# Patient Record
Sex: FEMALE | Race: WHITE | NOT HISPANIC OR LATINO | Employment: FULL TIME | URBAN - METROPOLITAN AREA
[De-identification: names, ages, dates, MRNs, and addresses within clinical notes are randomized per-mention and may not be internally consistent; named-entity substitution may affect disease eponyms.]

---

## 2017-05-16 ENCOUNTER — PATIENT MESSAGE (OUTPATIENT)
Dept: INTERNAL MEDICINE | Facility: CLINIC | Age: 47
End: 2017-05-16

## 2017-05-16 RX ORDER — ESCITALOPRAM OXALATE 10 MG/1
TABLET ORAL
Qty: 30 TABLET | Refills: 5 | Status: SHIPPED | OUTPATIENT
Start: 2017-05-16 | End: 2018-01-26

## 2017-06-07 ENCOUNTER — OFFICE VISIT (OUTPATIENT)
Dept: INTERNAL MEDICINE | Facility: CLINIC | Age: 47
End: 2017-06-07
Payer: COMMERCIAL

## 2017-06-07 ENCOUNTER — HOSPITAL ENCOUNTER (OUTPATIENT)
Dept: RADIOLOGY | Facility: HOSPITAL | Age: 47
Discharge: HOME | End: 2017-06-07
Attending: INTERNAL MEDICINE
Payer: COMMERCIAL

## 2017-06-07 VITALS
SYSTOLIC BLOOD PRESSURE: 134 MMHG | WEIGHT: 130.31 LBS | HEIGHT: 59 IN | DIASTOLIC BLOOD PRESSURE: 85 MMHG | HEART RATE: 70 BPM | BODY MASS INDEX: 26.27 KG/M2

## 2017-06-07 DIAGNOSIS — M25.541 ARTHRALGIA OF HANDS, BILATERAL: ICD-10-CM

## 2017-06-07 DIAGNOSIS — M53.3 SACRO-ILIAC PAIN: Primary | ICD-10-CM

## 2017-06-07 DIAGNOSIS — M25.542 ARTHRALGIA OF HANDS, BILATERAL: ICD-10-CM

## 2017-06-07 DIAGNOSIS — M25.475 SWELLING OF FIRST METATARSOPHALANGEAL (MTP) JOINT OF LEFT FOOT: ICD-10-CM

## 2017-06-07 DIAGNOSIS — M21.619 BUNION OF GREAT TOE: ICD-10-CM

## 2017-06-07 DIAGNOSIS — M53.3 SACRO-ILIAC PAIN: ICD-10-CM

## 2017-06-07 PROCEDURE — 73130 X-RAY EXAM OF HAND: CPT | Mod: 50,TC,PO

## 2017-06-07 PROCEDURE — 73130 X-RAY EXAM OF HAND: CPT | Mod: 26,50,, | Performed by: RADIOLOGY

## 2017-06-07 PROCEDURE — 72202 X-RAY EXAM SI JOINTS 3/> VWS: CPT | Mod: 26,,, | Performed by: RADIOLOGY

## 2017-06-07 PROCEDURE — 72100 X-RAY EXAM L-S SPINE 2/3 VWS: CPT | Mod: 26,,, | Performed by: RADIOLOGY

## 2017-06-07 PROCEDURE — 73630 X-RAY EXAM OF FOOT: CPT | Mod: TC,PO,LT

## 2017-06-07 PROCEDURE — 72100 X-RAY EXAM L-S SPINE 2/3 VWS: CPT | Mod: TC,PO

## 2017-06-07 PROCEDURE — 99999 PR PBB SHADOW E&M-EST. PATIENT-LVL IV: CPT | Mod: PBBFAC,,, | Performed by: INTERNAL MEDICINE

## 2017-06-07 PROCEDURE — 99214 OFFICE O/P EST MOD 30 MIN: CPT | Mod: S$GLB,,, | Performed by: INTERNAL MEDICINE

## 2017-06-07 PROCEDURE — 72202 X-RAY EXAM SI JOINTS 3/> VWS: CPT | Mod: TC,PO

## 2017-06-07 PROCEDURE — 73630 X-RAY EXAM OF FOOT: CPT | Mod: 26,LT,, | Performed by: RADIOLOGY

## 2017-06-07 RX ORDER — DICLOFENAC SODIUM 75 MG/1
75 TABLET, DELAYED RELEASE ORAL 2 TIMES DAILY
Qty: 20 TABLET | Refills: 0 | Status: SHIPPED | OUTPATIENT
Start: 2017-06-07 | End: 2017-06-17

## 2017-06-07 RX ORDER — CYCLOBENZAPRINE HCL 5 MG
5 TABLET ORAL NIGHTLY
Qty: 10 TABLET | Refills: 0 | Status: SHIPPED | OUTPATIENT
Start: 2017-06-07 | End: 2017-06-17

## 2017-06-07 NOTE — PROGRESS NOTES
REASON FOR VISIT:  This is a 46-year-old female.  For the past week, she has   been experiencing a pain localized in her right lower and upper buttocks region.    She states that it is similar to a pain that she had back in 2012, where she   had an on-the-job injury carrying boxes and she was diagnosed with right SI   joint dysfunction.  She underwent physical therapy and dry needling at that   time.  She mentions that her son has autism, mobility issue and scoliosis.  He   is 10 years old, weighs around 60+ pounds and she is constantly moving him and   picking him up and feels that it has put strain.    Another issue that has been going on for a while is pain at the MTP joint left   foot at the first and fifth metatarsal, but worse at the big toe and she has   also developed arthralgias involving her fingers, particularly at the MCP and   DIP.  At times they feel very stiff.    PAST MEDICAL HISTORY:  Depression, anxiety.  History of rhinitis.  Asthma.    MEDICATIONS:  List per MedCard.    PHYSICAL EXAMINATION:  VITAL SIGNS:  Per EPIC.  EXTREMITIES:  Right now she is not tender over the MCP joints of both hands.    There is some minimal nodular findings noted at the DIP joints.  She has 2+   biceps, triceps, knee and ankle jerk reflexes.  She is tender over the first MTP   left foot and bunion deformity noted.  There is an area of discomfort in the   right lower back localized and at the upper buttocks.    IMPRESSION:  1.  Acute lumbosacral pain.  2.  Bunion deformity.  3.  MTP joint pain, possibly arthritis.  4.  Hand arthralgias.    PLAN:    Today we will get x-rays of the lumbar spine, sacroiliac joints, and of both   hands and left foot.  We will get labs of sed rate, CRP, MELINDA, rheumatoid   arthritis factor, CCP.    In the meantime, diclofenac 75 mg twice a day for 10 days, Flexeril 5 mg at   bedtime.          /lissette [id number] review        KARYN/DIANE  dd: 06/07/2017 16:49:37 (CDT)  td: 06/08/2017 10:29:39 (CDT)  Doc ID    #[id number]  Job ID #[id number]    CC:

## 2017-06-10 ENCOUNTER — PATIENT MESSAGE (OUTPATIENT)
Dept: INTERNAL MEDICINE | Facility: CLINIC | Age: 47
End: 2017-06-10

## 2017-06-15 ENCOUNTER — PATIENT MESSAGE (OUTPATIENT)
Dept: INTERNAL MEDICINE | Facility: CLINIC | Age: 47
End: 2017-06-15

## 2017-06-23 ENCOUNTER — PATIENT MESSAGE (OUTPATIENT)
Dept: INTERNAL MEDICINE | Facility: CLINIC | Age: 47
End: 2017-06-23

## 2017-06-26 ENCOUNTER — LAB VISIT (OUTPATIENT)
Dept: LAB | Facility: HOSPITAL | Age: 47
End: 2017-06-26
Attending: INTERNAL MEDICINE
Payer: COMMERCIAL

## 2017-06-26 ENCOUNTER — OFFICE VISIT (OUTPATIENT)
Dept: INTERNAL MEDICINE | Facility: CLINIC | Age: 47
End: 2017-06-26
Payer: COMMERCIAL

## 2017-06-26 VITALS
HEIGHT: 59 IN | DIASTOLIC BLOOD PRESSURE: 86 MMHG | WEIGHT: 131.38 LBS | SYSTOLIC BLOOD PRESSURE: 128 MMHG | BODY MASS INDEX: 26.48 KG/M2 | HEART RATE: 74 BPM

## 2017-06-26 DIAGNOSIS — M25.50 ARTHRALGIA, UNSPECIFIED JOINT: ICD-10-CM

## 2017-06-26 DIAGNOSIS — R23.3 PETECHIAE: ICD-10-CM

## 2017-06-26 DIAGNOSIS — R51.9 ACUTE NONINTRACTABLE HEADACHE, UNSPECIFIED HEADACHE TYPE: ICD-10-CM

## 2017-06-26 DIAGNOSIS — R51.9 ACUTE NONINTRACTABLE HEADACHE, UNSPECIFIED HEADACHE TYPE: Primary | ICD-10-CM

## 2017-06-26 DIAGNOSIS — H81.93 VESTIBULAR DIZZINESS, BILATERAL: ICD-10-CM

## 2017-06-26 LAB
ANION GAP SERPL CALC-SCNC: 9 MMOL/L
BASOPHILS # BLD AUTO: 0.03 K/UL
BASOPHILS NFR BLD: 0.4 %
BUN SERPL-MCNC: 10 MG/DL
CALCIUM SERPL-MCNC: 9.9 MG/DL
CHLORIDE SERPL-SCNC: 101 MMOL/L
CO2 SERPL-SCNC: 27 MMOL/L
CREAT SERPL-MCNC: 0.9 MG/DL
CRP SERPL-MCNC: 3.6 MG/L
DIFFERENTIAL METHOD: NORMAL
EOSINOPHIL # BLD AUTO: 0.2 K/UL
EOSINOPHIL NFR BLD: 2.8 %
ERYTHROCYTE [DISTWIDTH] IN BLOOD BY AUTOMATED COUNT: 13.1 %
ERYTHROCYTE [SEDIMENTATION RATE] IN BLOOD BY WESTERGREN METHOD: 6 MM/HR
EST. GFR  (AFRICAN AMERICAN): >60 ML/MIN/1.73 M^2
EST. GFR  (NON AFRICAN AMERICAN): >60 ML/MIN/1.73 M^2
GLUCOSE SERPL-MCNC: 92 MG/DL
HCT VFR BLD AUTO: 45.8 %
HGB BLD-MCNC: 15 G/DL
LYMPHOCYTES # BLD AUTO: 2.7 K/UL
LYMPHOCYTES NFR BLD: 35 %
MCH RBC QN AUTO: 28.8 PG
MCHC RBC AUTO-ENTMCNC: 32.8 %
MCV RBC AUTO: 88 FL
MONOCYTES # BLD AUTO: 0.8 K/UL
MONOCYTES NFR BLD: 9.7 %
NEUTROPHILS # BLD AUTO: 4 K/UL
NEUTROPHILS NFR BLD: 52.1 %
PLATELET # BLD AUTO: 323 K/UL
PMV BLD AUTO: 9.6 FL
POTASSIUM SERPL-SCNC: 4.4 MMOL/L
RBC # BLD AUTO: 5.2 M/UL
SODIUM SERPL-SCNC: 137 MMOL/L
WBC # BLD AUTO: 7.75 K/UL

## 2017-06-26 PROCEDURE — 86039 ANTINUCLEAR ANTIBODIES (ANA): CPT

## 2017-06-26 PROCEDURE — 85025 COMPLETE CBC W/AUTO DIFF WBC: CPT | Mod: PO

## 2017-06-26 PROCEDURE — 99999 PR PBB SHADOW E&M-EST. PATIENT-LVL III: CPT | Mod: PBBFAC,,, | Performed by: INTERNAL MEDICINE

## 2017-06-26 PROCEDURE — 36415 COLL VENOUS BLD VENIPUNCTURE: CPT | Mod: PO

## 2017-06-26 PROCEDURE — 80048 BASIC METABOLIC PNL TOTAL CA: CPT

## 2017-06-26 PROCEDURE — 86140 C-REACTIVE PROTEIN: CPT

## 2017-06-26 PROCEDURE — 86038 ANTINUCLEAR ANTIBODIES: CPT

## 2017-06-26 PROCEDURE — 86235 NUCLEAR ANTIGEN ANTIBODY: CPT | Mod: 59

## 2017-06-26 PROCEDURE — 99214 OFFICE O/P EST MOD 30 MIN: CPT | Mod: S$GLB,,, | Performed by: INTERNAL MEDICINE

## 2017-06-26 PROCEDURE — 85651 RBC SED RATE NONAUTOMATED: CPT

## 2017-06-26 NOTE — TELEPHONE ENCOUNTER
Called pt back and pt states that in the past few weeks she has been experiencing severe dizziness and after the dizziness subsided the she started with a terrible headache. Pt states that she is just healing from mouth and nose sores. She said that the headache was like a pounding pressure. She feels she needed to be seen today. Scheduled for 3755am

## 2017-06-26 NOTE — PROGRESS NOTES
REASON FOR VISIT:  This is a 46-year-old female.  The reason for her visit is   that three days ago after getting home in the late afternoon, she was walking in   her house and all of a sudden felt dizziness that came upon her, as if she   needed to hold onto the walls and she just felt weak.  It lasted for about three   hours.  It was hard for her to sit up in bed.  There was no nausea.  She could   not focus well, but no other acute visual problems.  No hearing problems.  She   did not have any paresthesias involving the extremities.  Since that time she   has persistently had a pressure discomfort which she would not describe as a   headache, but just a pressure discomfort on top of her head.  Prior to that, she   did develop sores within her mouth which have passed and also red, erythematous   dots on her legs which she showed me a picture of and which looked like small   petechiae.  In reference to her visit with me on June 8, she still has   arthralgias that involve her hands, elbows, and feet.  She did have a mildly   positive MELINDA, but the subsets were negative and inflammatory markers were   normal.    PAST MEDICAL HISTORY:  Anxiety and depression.    MEDICATIONS:  Lexapro.    PHYSICAL EXAMINATION:  VITAL SIGNS:  Blood pressure 128/86.  LUNGS:  Clear.  HEART:  Regular rate and rhythm.  NECK:  No thyromegaly.  NEUROLOGIC:  Cranial nerves II-XII are normal.  Negative Romberg testing.    Negative finger-to-nose.  MUSCULOSKELETAL:  2+ biceps, triceps, knee and ankle jerk reflexes.    IMPRESSION:  1.  Acute headache with dizziness.  2.  Possible vasculitis.  3.  Arthralgias.    PLAN:  Today while she is here, we will repeat a CBC with basic metabolic   profile and get another MELINDA titer.  Her rheumatoid arthritis factor and CCP were   negative.  She may need to be seen in Rheumatology.  The other tests to do will   be a CT of the head with CTA of the head.    /sc [id number] review      JAM/DIANE  dd: 06/26/2017 12:09:28  (CDT)  td: 06/27/2017 03:24:21 (CDT)  Doc ID   #[id number]  Job ID #[id number]    CC:             Until dictation becomes available , this is to note that patient presents with reoccurring episodes right sided headaches , dizziness , both being off balance as well spinning vertigo like dizziness , visual distortion       She is also getting a reoccurring dermatitis and arthralgia    Recommend pursuing cta of head and neck

## 2017-06-27 LAB
ANA SER QL IF: POSITIVE
ANA TITR SER IF: NORMAL {TITER}

## 2017-06-29 ENCOUNTER — TELEPHONE (OUTPATIENT)
Dept: INTERNAL MEDICINE | Facility: CLINIC | Age: 47
End: 2017-06-29

## 2017-06-29 LAB
ANTI SM ANTIBODY: 1.2 EU
ANTI SM/RNP ANTIBODY: 0.69 EU
ANTI-SM INTERPRETATION: NEGATIVE
ANTI-SM/RNP INTERPRETATION: NEGATIVE
ANTI-SSA ANTIBODY: 1.2 EU
ANTI-SSA INTERPRETATION: NEGATIVE
ANTI-SSB ANTIBODY: 0.29 EU
ANTI-SSB INTERPRETATION: NEGATIVE
DSDNA AB SER-ACNC: NORMAL [IU]/ML

## 2017-06-29 NOTE — TELEPHONE ENCOUNTER
Please put in notes so that pt can get her ct done, she does not want to reschedule because of her headaches . They will not go through with the procedure unless there are labs in because the visit needs to be authorized.

## 2017-07-01 ENCOUNTER — HOSPITAL ENCOUNTER (OUTPATIENT)
Dept: RADIOLOGY | Facility: HOSPITAL | Age: 47
Discharge: HOME | End: 2017-07-01
Attending: INTERNAL MEDICINE
Payer: COMMERCIAL

## 2017-07-01 DIAGNOSIS — R51.9 ACUTE NONINTRACTABLE HEADACHE, UNSPECIFIED HEADACHE TYPE: ICD-10-CM

## 2017-07-01 DIAGNOSIS — R23.3 PETECHIAE: ICD-10-CM

## 2017-07-01 DIAGNOSIS — H81.93 VESTIBULAR DIZZINESS, BILATERAL: ICD-10-CM

## 2017-07-01 DIAGNOSIS — M25.50 ARTHRALGIA, UNSPECIFIED JOINT: ICD-10-CM

## 2017-07-01 PROCEDURE — 70496 CT ANGIOGRAPHY HEAD: CPT | Mod: TC

## 2017-07-01 PROCEDURE — 25500020 PHARM REV CODE 255: Performed by: INTERNAL MEDICINE

## 2017-07-01 PROCEDURE — 70498 CT ANGIOGRAPHY NECK: CPT | Mod: 26,,, | Performed by: RADIOLOGY

## 2017-07-01 PROCEDURE — 70496 CT ANGIOGRAPHY HEAD: CPT | Mod: 26,,, | Performed by: RADIOLOGY

## 2017-07-01 RX ADMIN — IOHEXOL 75 ML: 350 INJECTION, SOLUTION INTRAVENOUS at 11:07

## 2017-07-03 ENCOUNTER — PATIENT MESSAGE (OUTPATIENT)
Dept: INTERNAL MEDICINE | Facility: CLINIC | Age: 47
End: 2017-07-03

## 2017-07-03 RX ORDER — ETODOLAC 400 MG/1
400 TABLET, FILM COATED ORAL 2 TIMES DAILY
Qty: 20 TABLET | Refills: 0 | Status: SHIPPED | OUTPATIENT
Start: 2017-07-03 | End: 2017-07-13

## 2017-07-03 NOTE — TELEPHONE ENCOUNTER
CTA head was fine   Lab results were fine     Main complaint is back pain and mouth ulcers    Call in Lodine and dukes solution

## 2017-10-11 ENCOUNTER — OFFICE VISIT (OUTPATIENT)
Dept: INTERNAL MEDICINE | Facility: CLINIC | Age: 47
End: 2017-10-11
Payer: COMMERCIAL

## 2017-10-11 VITALS
DIASTOLIC BLOOD PRESSURE: 79 MMHG | HEART RATE: 79 BPM | HEIGHT: 59 IN | WEIGHT: 121.69 LBS | BODY MASS INDEX: 24.53 KG/M2 | SYSTOLIC BLOOD PRESSURE: 123 MMHG | TEMPERATURE: 99 F

## 2017-10-11 DIAGNOSIS — H65.03 BILATERAL ACUTE SEROUS OTITIS MEDIA, RECURRENCE NOT SPECIFIED: ICD-10-CM

## 2017-10-11 DIAGNOSIS — J01.90 ACUTE NON-RECURRENT SINUSITIS, UNSPECIFIED LOCATION: Primary | ICD-10-CM

## 2017-10-11 PROCEDURE — 99999 PR PBB SHADOW E&M-EST. PATIENT-LVL III: CPT | Mod: PBBFAC,,, | Performed by: INTERNAL MEDICINE

## 2017-10-11 PROCEDURE — 96372 THER/PROPH/DIAG INJ SC/IM: CPT | Mod: S$GLB,,, | Performed by: INTERNAL MEDICINE

## 2017-10-11 PROCEDURE — 99213 OFFICE O/P EST LOW 20 MIN: CPT | Mod: 25,S$GLB,, | Performed by: INTERNAL MEDICINE

## 2017-10-11 RX ORDER — ISOTRETINOIN 40 MG/1
40 CAPSULE ORAL 2 TIMES DAILY
Refills: 0 | COMMUNITY
Start: 2017-10-05 | End: 2018-07-22

## 2017-10-11 RX ORDER — AZITHROMYCIN 250 MG/1
TABLET, FILM COATED ORAL
Qty: 6 TABLET | Refills: 1 | Status: SHIPPED | OUTPATIENT
Start: 2017-10-11 | End: 2018-01-26

## 2017-10-11 RX ORDER — BETAMETHASONE SODIUM PHOSPHATE AND BETAMETHASONE ACETATE 3; 3 MG/ML; MG/ML
12 INJECTION, SUSPENSION INTRA-ARTICULAR; INTRALESIONAL; INTRAMUSCULAR; SOFT TISSUE ONCE
Status: COMPLETED | OUTPATIENT
Start: 2017-10-11 | End: 2017-10-11

## 2017-10-11 RX ORDER — MUPIROCIN 20 MG/G
OINTMENT TOPICAL
COMMUNITY
Start: 2017-08-31 | End: 2018-01-26

## 2017-10-11 RX ADMIN — BETAMETHASONE SODIUM PHOSPHATE AND BETAMETHASONE ACETATE 12 MG: 3; 3 INJECTION, SUSPENSION INTRA-ARTICULAR; INTRALESIONAL; INTRAMUSCULAR; SOFT TISSUE at 03:10

## 2017-10-11 NOTE — PROGRESS NOTES
REASON FOR VISIT:  This is a 46-year-old female.  For a week now, she has been   sneezing, now very congested and uncomfortable, and pressure across her face.    Ears feel full and popping.  She is blowing out thick green mucus.  Her throat   is irritated.    PAST MEDICAL HISTORY:   Anxiety, depression.  Acne.    MEDICATIONS:  List per MedCard.    PHYSICAL EXAMINATION:  VITAL SIGNS:  Per Epic.  HEENT:  Ear canals are open, but very opaque.  Nasal mucosa is congested in the   left nostril.  Tender over the left maxillary sinus.  Oropharynx hyperemic.  NECK:  No adenopathy, although she saw within the left submandibular region.  LUNGS:  Clear.    IMPRESSION:  1.  Acute sinusitis.  2.  Serous otitis media.    PLAN:  Zithromax Z-Richard with one refill given, Celestone 12 mg IM, and the use of   Mucinex.    /sc [id number] review      JAM/DIANE  dd: 10/11/2017 15:49:07 (CDT)  td: 10/12/2017 05:50:23 (CDT)  Doc ID   #[id number]  Job ID #[id number]    CC:

## 2018-01-24 ENCOUNTER — PATIENT MESSAGE (OUTPATIENT)
Dept: INTERNAL MEDICINE | Facility: CLINIC | Age: 48
End: 2018-01-24

## 2018-01-26 ENCOUNTER — OFFICE VISIT (OUTPATIENT)
Dept: INTERNAL MEDICINE | Facility: CLINIC | Age: 48
End: 2018-01-26
Payer: COMMERCIAL

## 2018-01-26 ENCOUNTER — LAB VISIT (OUTPATIENT)
Dept: LAB | Facility: HOSPITAL | Age: 48
End: 2018-01-26
Attending: INTERNAL MEDICINE
Payer: COMMERCIAL

## 2018-01-26 VITALS
BODY MASS INDEX: 25.64 KG/M2 | DIASTOLIC BLOOD PRESSURE: 82 MMHG | WEIGHT: 127.19 LBS | HEIGHT: 59 IN | SYSTOLIC BLOOD PRESSURE: 134 MMHG | TEMPERATURE: 98 F | HEART RATE: 79 BPM

## 2018-01-26 DIAGNOSIS — R10.84 GENERALIZED ABDOMINAL CRAMPS: ICD-10-CM

## 2018-01-26 DIAGNOSIS — R19.7 DIARRHEA, UNSPECIFIED TYPE: ICD-10-CM

## 2018-01-26 DIAGNOSIS — R42 VERTIGO: ICD-10-CM

## 2018-01-26 DIAGNOSIS — K58.2 IRRITABLE BOWEL SYNDROME WITH BOTH CONSTIPATION AND DIARRHEA: ICD-10-CM

## 2018-01-26 DIAGNOSIS — H92.03 OTALGIA OF BOTH EARS: ICD-10-CM

## 2018-01-26 DIAGNOSIS — R42 VERTIGO: Primary | ICD-10-CM

## 2018-01-26 LAB
ALBUMIN SERPL BCP-MCNC: 3.7 G/DL
ALP SERPL-CCNC: 58 U/L
ALT SERPL W/O P-5'-P-CCNC: 8 U/L
ANION GAP SERPL CALC-SCNC: 9 MMOL/L
AST SERPL-CCNC: 16 U/L
BASOPHILS # BLD AUTO: 0.03 K/UL
BASOPHILS NFR BLD: 0.4 %
BILIRUB SERPL-MCNC: 0.4 MG/DL
BUN SERPL-MCNC: 15 MG/DL
CALCIUM SERPL-MCNC: 9.5 MG/DL
CHLORIDE SERPL-SCNC: 104 MMOL/L
CO2 SERPL-SCNC: 26 MMOL/L
CREAT SERPL-MCNC: 0.8 MG/DL
CRP SERPL-MCNC: 1.3 MG/L
DIFFERENTIAL METHOD: ABNORMAL
EOSINOPHIL # BLD AUTO: 0.2 K/UL
EOSINOPHIL NFR BLD: 2.8 %
ERYTHROCYTE [DISTWIDTH] IN BLOOD BY AUTOMATED COUNT: 12.9 %
ERYTHROCYTE [SEDIMENTATION RATE] IN BLOOD BY WESTERGREN METHOD: 6 MM/HR
EST. GFR  (AFRICAN AMERICAN): >60 ML/MIN/1.73 M^2
EST. GFR  (NON AFRICAN AMERICAN): >60 ML/MIN/1.73 M^2
GLUCOSE SERPL-MCNC: 90 MG/DL
HCT VFR BLD AUTO: 43.6 %
HGB BLD-MCNC: 14.3 G/DL
LYMPHOCYTES # BLD AUTO: 2.7 K/UL
LYMPHOCYTES NFR BLD: 38.3 %
MCH RBC QN AUTO: 29.2 PG
MCHC RBC AUTO-ENTMCNC: 32.8 G/DL
MCV RBC AUTO: 89 FL
MONOCYTES # BLD AUTO: 0.8 K/UL
MONOCYTES NFR BLD: 11.5 %
NEUTROPHILS # BLD AUTO: 3.4 K/UL
NEUTROPHILS NFR BLD: 47 %
PLATELET # BLD AUTO: 379 K/UL
PMV BLD AUTO: 10.2 FL
POTASSIUM SERPL-SCNC: 4.4 MMOL/L
PROT SERPL-MCNC: 7.2 G/DL
RBC # BLD AUTO: 4.9 M/UL
SODIUM SERPL-SCNC: 139 MMOL/L
TSH SERPL DL<=0.005 MIU/L-ACNC: 1.75 UIU/ML
WBC # BLD AUTO: 7.16 K/UL

## 2018-01-26 PROCEDURE — 84443 ASSAY THYROID STIM HORMONE: CPT

## 2018-01-26 PROCEDURE — 86140 C-REACTIVE PROTEIN: CPT

## 2018-01-26 PROCEDURE — 36415 COLL VENOUS BLD VENIPUNCTURE: CPT | Mod: PO

## 2018-01-26 PROCEDURE — 85025 COMPLETE CBC W/AUTO DIFF WBC: CPT | Mod: PO

## 2018-01-26 PROCEDURE — 85651 RBC SED RATE NONAUTOMATED: CPT

## 2018-01-26 PROCEDURE — 99999 PR PBB SHADOW E&M-EST. PATIENT-LVL III: CPT | Mod: PBBFAC,,, | Performed by: INTERNAL MEDICINE

## 2018-01-26 PROCEDURE — 99214 OFFICE O/P EST MOD 30 MIN: CPT | Mod: S$GLB,,, | Performed by: INTERNAL MEDICINE

## 2018-01-26 PROCEDURE — 80053 COMPREHEN METABOLIC PANEL: CPT

## 2018-01-26 PROCEDURE — 99213 OFFICE O/P EST LOW 20 MIN: CPT | Mod: PBBFAC,PO | Performed by: INTERNAL MEDICINE

## 2018-01-26 RX ORDER — DICYCLOMINE HYDROCHLORIDE 20 MG/1
20 TABLET ORAL 4 TIMES DAILY PRN
Qty: 30 TABLET | Refills: 0 | Status: SHIPPED | OUTPATIENT
Start: 2018-01-26 | End: 2018-07-22

## 2018-01-26 RX ORDER — DIAZEPAM 5 MG/1
5 TABLET ORAL NIGHTLY
Qty: 7 TABLET | Refills: 0 | Status: SHIPPED | OUTPATIENT
Start: 2018-01-26 | End: 2018-02-02 | Stop reason: SDUPTHER

## 2018-01-26 NOTE — PROGRESS NOTES
Answers for HPI/ROS submitted by the patient on 1/25/2018   activity change: No  unexpected weight change: No  neck pain: Yes  hearing loss: No  rhinorrhea: No  trouble swallowing: No  eye discharge: No  visual disturbance: No  chest tightness: No  wheezing: No  chest pain: No  palpitations: No  blood in stool: No  constipation: No  vomiting: No  diarrhea: Yes  polydipsia: No  polyuria: No  difficulty urinating: No  hematuria: No  menstrual problem: No  dysuria: No  joint swelling: No  arthralgias: Yes  headaches: Yes  weakness: Yes  confusion: Yes  dysphoric mood: No      REASON FOR VISIT:  This is a 47-year-old female.  She comes in having a number   of symptoms.  She remembers on January 5, she got up and felt very dizzy and she   immediately went down to the ground.  She did not pass out, but when she got   up, she felt like things were spinning.  It has been that way recurrently since   then where when she bends over or turns her head real quick, there has been   dizziness as if her visual field is swaying back and forth.  For the past two   weeks, she has had posterior neck pain.  She has had intermittent episodes of   either ear buzzing and a discomfort.  She has been nauseated off and on.  There   has been no diplopia, but just visual focus issues, no significant headaches.    She does have chronic postnasal drip and she will take Benadryl.    In the interim, in the past two weeks, she has had recurrent lower abdominal   cramps and bowel function could either be constipated, not having a bowel   movement for a few days, and then having episodes of explosive diarrhea.  She   expressed a concern about listeria because she eats fresh vegetables and fruits   that she gets at [facility] and apparently there might have been a listeria   outbreak at [facility].    She is not under any emotional stress.  She got laid off of work in October and   now she has a new job, but she emphatically states that she is not anxious or    depressed.    PAST MEDICAL HISTORY:   Depression and anxiety.  Recently she was on the medicine, Absorica 40 mg for acne, but she stopped the   medication.    PHYSICAL EXAMINATION:  GENERAL:  She does not appear toxic or ill appearing.  VITAL SIGNS:  Blood pressure reading 134/82.  HEENT:  Slight hyperemia, left ear.  Nasal mucosa is clear.  Oropharynx, no   lesions.  NECK:  No adenopathy.  LUNGS:  Clear.  HEART:  Regular rate and rhythm.  ABDOMEN:  Active bowel sounds, soft, uncomfortable in the hypogastric, left   lower, and epigastric region.  She did have a positive Nylen-Barany maneuver.    IMPRESSION:  1.  Chronic vestibulopathy.  2.  Abdominal pain with both constipation and diarrhea, probably irritable bowel   condition.  3.  Possible viral syndrome accounting for both.    PLAN:  Today we will get labs of chemistry, CBC, TSH, inflammatory markers, and   stool analysis.  We will give Valium 5 mg at bedtime for the next week to see   how she responds and a prescription for Bentyl to use as needed for abdominal   pain.  Take Metamucil twice a day, increase fluid intake, and if she gets   constipated, trial of MiraLax.      KARYN/DIANE  dd: 01/26/2018 12:17:04 (CST)  td: 01/27/2018 06:15:43 (CST)  Doc ID   #[id number]  Job ID #[id number]    CC:

## 2018-01-29 ENCOUNTER — LAB VISIT (OUTPATIENT)
Dept: LAB | Facility: HOSPITAL | Age: 48
End: 2018-01-29
Attending: INTERNAL MEDICINE
Payer: COMMERCIAL

## 2018-01-29 ENCOUNTER — PATIENT MESSAGE (OUTPATIENT)
Dept: INTERNAL MEDICINE | Facility: CLINIC | Age: 48
End: 2018-01-29

## 2018-01-29 DIAGNOSIS — R42 VERTIGO: ICD-10-CM

## 2018-01-29 DIAGNOSIS — K58.2 IRRITABLE BOWEL SYNDROME WITH BOTH CONSTIPATION AND DIARRHEA: ICD-10-CM

## 2018-01-29 DIAGNOSIS — R19.7 DIARRHEA, UNSPECIFIED TYPE: ICD-10-CM

## 2018-01-29 DIAGNOSIS — R10.84 GENERALIZED ABDOMINAL CRAMPS: ICD-10-CM

## 2018-01-29 DIAGNOSIS — H92.03 OTALGIA OF BOTH EARS: ICD-10-CM

## 2018-01-29 PROCEDURE — 87427 SHIGA-LIKE TOXIN AG IA: CPT

## 2018-01-29 PROCEDURE — 87045 FECES CULTURE AEROBIC BACT: CPT

## 2018-01-29 PROCEDURE — 87046 STOOL CULTR AEROBIC BACT EA: CPT | Mod: 59

## 2018-01-29 PROCEDURE — 87209 SMEAR COMPLEX STAIN: CPT

## 2018-01-29 PROCEDURE — 89055 LEUKOCYTE ASSESSMENT FECAL: CPT

## 2018-01-30 LAB
O+P STL TRI STN: NORMAL
WBC #/AREA STL HPF: NORMAL /[HPF]

## 2018-01-31 LAB
E COLI SXT1 STL QL IA: NEGATIVE
E COLI SXT2 STL QL IA: NEGATIVE

## 2018-02-02 ENCOUNTER — PATIENT MESSAGE (OUTPATIENT)
Dept: INTERNAL MEDICINE | Facility: CLINIC | Age: 48
End: 2018-02-02

## 2018-02-02 LAB — BACTERIA STL CULT: NORMAL

## 2018-02-02 RX ORDER — DIAZEPAM 5 MG/1
5 TABLET ORAL NIGHTLY
Qty: 7 TABLET | Refills: 0 | Status: SHIPPED | OUTPATIENT
Start: 2018-02-02 | End: 2018-07-22

## 2018-06-14 DIAGNOSIS — Z12.39 BREAST CANCER SCREENING: ICD-10-CM

## 2018-07-21 ENCOUNTER — OFFICE VISIT (OUTPATIENT)
Dept: INTERNAL MEDICINE | Facility: CLINIC | Age: 48
End: 2018-07-21
Payer: MEDICAID

## 2018-07-21 VITALS
DIASTOLIC BLOOD PRESSURE: 62 MMHG | BODY MASS INDEX: 23.76 KG/M2 | HEART RATE: 88 BPM | WEIGHT: 118.81 LBS | OXYGEN SATURATION: 99 % | TEMPERATURE: 100 F | SYSTOLIC BLOOD PRESSURE: 120 MMHG

## 2018-07-21 DIAGNOSIS — J02.9 PHARYNGITIS, UNSPECIFIED ETIOLOGY: Primary | ICD-10-CM

## 2018-07-21 LAB
CTP QC/QA: YES
S PYO RRNA THROAT QL PROBE: NEGATIVE

## 2018-07-21 PROCEDURE — 87070 CULTURE OTHR SPECIMN AEROBIC: CPT

## 2018-07-21 PROCEDURE — 99999 PR PBB SHADOW E&M-EST. PATIENT-LVL III: CPT | Mod: PBBFAC,,, | Performed by: INTERNAL MEDICINE

## 2018-07-21 PROCEDURE — 99213 OFFICE O/P EST LOW 20 MIN: CPT | Mod: S$PBB,,, | Performed by: INTERNAL MEDICINE

## 2018-07-21 PROCEDURE — 87880 STREP A ASSAY W/OPTIC: CPT | Mod: PBBFAC | Performed by: INTERNAL MEDICINE

## 2018-07-21 PROCEDURE — 3008F BODY MASS INDEX DOCD: CPT | Mod: CPTII,S$GLB,, | Performed by: INTERNAL MEDICINE

## 2018-07-21 RX ORDER — BENZONATATE 200 MG/1
200 CAPSULE ORAL 2 TIMES DAILY PRN
Qty: 20 CAPSULE | Refills: 0 | Status: SHIPPED | OUTPATIENT
Start: 2018-07-21 | End: 2018-07-28

## 2018-07-21 NOTE — PROGRESS NOTES
Subjective:       Patient ID: Kaylee Clement is a 47 y.o. female.    Chief Complaint: Sore Throat    Sore Throat    This is a new problem. The current episode started in the past 7 days. The problem has been unchanged. Neither side of throat is experiencing more pain than the other. There has been no fever. The pain is at a severity of 2/10. The pain is mild. Associated symptoms include coughing and a hoarse voice. Pertinent negatives include no abdominal pain, congestion, diarrhea, ear discharge, ear pain, headaches, shortness of breath, swollen glands or vomiting. She has had no exposure to strep or mono. She has tried NSAIDs for the symptoms. The treatment provided mild relief.     Review of Systems   Constitutional: Negative for activity change, chills, fatigue and fever.   HENT: Positive for hoarse voice and sore throat. Negative for congestion, ear discharge, ear pain, nosebleeds, postnasal drip and sinus pressure.    Eyes: Negative.  Negative for visual disturbance.   Respiratory: Positive for cough. Negative for chest tightness, shortness of breath and wheezing.    Cardiovascular: Negative for chest pain.   Gastrointestinal: Negative for abdominal pain, diarrhea, nausea and vomiting.   Genitourinary: Negative for difficulty urinating, dysuria, frequency and urgency.   Musculoskeletal: Negative for arthralgias and neck stiffness.   Skin: Negative for rash.   Neurological: Negative for dizziness, weakness and headaches.   Psychiatric/Behavioral: Negative for sleep disturbance. The patient is not nervous/anxious.        Objective:      Physical Exam   Constitutional: She is oriented to person, place, and time. She appears well-developed and well-nourished.  Non-toxic appearance. No distress.   HENT:   Head: Normocephalic and atraumatic.   Right Ear: Tympanic membrane, external ear and ear canal normal.   Left Ear: Tympanic membrane, external ear and ear canal normal.   Eyes: EOM are normal. Pupils are  equal, round, and reactive to light. No scleral icterus.   Neck: Normal range of motion. Neck supple. No thyromegaly present.   Cardiovascular: Normal rate, regular rhythm and normal heart sounds.    Pulmonary/Chest: Effort normal and breath sounds normal.   Abdominal: Soft. Bowel sounds are normal. She exhibits no mass. There is no tenderness. There is no rebound.   Musculoskeletal: Normal range of motion.   Lymphadenopathy:     She has no cervical adenopathy.   Neurological: She is alert and oriented to person, place, and time. She has normal reflexes. She displays normal reflexes. No cranial nerve deficit. She exhibits normal muscle tone. Coordination normal.   Skin: Skin is warm and dry.   Psychiatric: She has a normal mood and affect. Her behavior is normal.       Assessment:       1. Pharyngitis, unspecified etiology        Plan:   Kaylee Rizvi was seen today for sore throat.    Diagnoses and all orders for this visit:    Pharyngitis, unspecified etiology  -     POCT Rapid Strep A  -     Throat culture    Other orders  -     benzonatate (TESSALON) 200 MG capsule; Take 1 capsule (200 mg total) by mouth 2 (two) times daily as needed for Cough.

## 2018-07-22 ENCOUNTER — HOSPITAL ENCOUNTER (EMERGENCY)
Facility: HOSPITAL | Age: 48
Discharge: HOME | End: 2018-07-22
Payer: MEDICAID

## 2018-07-22 VITALS
HEART RATE: 81 BPM | HEIGHT: 60 IN | OXYGEN SATURATION: 98 % | BODY MASS INDEX: 22.97 KG/M2 | WEIGHT: 117 LBS | SYSTOLIC BLOOD PRESSURE: 131 MMHG | RESPIRATION RATE: 18 BRPM | DIASTOLIC BLOOD PRESSURE: 89 MMHG | TEMPERATURE: 99 F

## 2018-07-22 DIAGNOSIS — J02.9 PHARYNGITIS, UNSPECIFIED ETIOLOGY: Primary | ICD-10-CM

## 2018-07-22 PROCEDURE — 63600175 PHARM REV CODE 636 W HCPCS

## 2018-07-22 PROCEDURE — 96372 THER/PROPH/DIAG INJ SC/IM: CPT

## 2018-07-22 PROCEDURE — 25000003 PHARM REV CODE 250

## 2018-07-22 PROCEDURE — 99283 EMERGENCY DEPT VISIT LOW MDM: CPT | Mod: 25

## 2018-07-22 RX ORDER — DEXAMETHASONE SODIUM PHOSPHATE 4 MG/ML
8 INJECTION, SOLUTION INTRA-ARTICULAR; INTRALESIONAL; INTRAMUSCULAR; INTRAVENOUS; SOFT TISSUE
Status: COMPLETED | OUTPATIENT
Start: 2018-07-22 | End: 2018-07-22

## 2018-07-22 RX ORDER — FLUTICASONE PROPIONATE 50 MCG
1 SPRAY, SUSPENSION (ML) NASAL 2 TIMES DAILY PRN
Qty: 15 G | Refills: 0 | Status: SHIPPED | OUTPATIENT
Start: 2018-07-22 | End: 2019-07-30

## 2018-07-22 RX ORDER — KETOROLAC TROMETHAMINE 30 MG/ML
30 INJECTION, SOLUTION INTRAMUSCULAR; INTRAVENOUS
Status: COMPLETED | OUTPATIENT
Start: 2018-07-22 | End: 2018-07-22

## 2018-07-22 RX ORDER — IBUPROFEN 600 MG/1
600 TABLET ORAL EVERY 6 HOURS PRN
Qty: 20 TABLET | Refills: 0 | Status: SHIPPED | OUTPATIENT
Start: 2018-07-22 | End: 2018-12-11

## 2018-07-22 RX ORDER — LIDOCAINE HYDROCHLORIDE 20 MG/ML
5 SOLUTION OROPHARYNGEAL
Status: COMPLETED | OUTPATIENT
Start: 2018-07-22 | End: 2018-07-22

## 2018-07-22 RX ADMIN — LIDOCAINE HYDROCHLORIDE 5 ML: 20 SOLUTION ORAL; TOPICAL at 04:07

## 2018-07-22 RX ADMIN — DEXAMETHASONE SODIUM PHOSPHATE 8 MG: 4 INJECTION, SOLUTION INTRAMUSCULAR; INTRAVENOUS at 04:07

## 2018-07-22 RX ADMIN — KETOROLAC TROMETHAMINE 30 MG: 30 INJECTION, SOLUTION INTRAMUSCULAR at 04:07

## 2018-07-22 NOTE — ED NOTES
Complaining of sore throat x 1 week. States she went to urgent care but is not feeling any better.

## 2018-07-22 NOTE — ED PROVIDER NOTES
Encounter Date: 2018    SCRIBE #1 NOTE: I, Sheree Cardoza, am scribing for, and in the presence of,  Dr. Almanzar. I have scribed the entire note.       History     Chief Complaint   Patient presents with    Sore Throat     reports was seen at urgent care yesterday for same symptoms. states symptoms have worsened.      Kaylee Clement is a 47 y.o. female who  has a past medical history of Anxiety and IBS (irritable bowel syndrome).    The patient presents to the ED due to sore throat. She reports onset of symptoms was 9 days ago. The patient states the pain in her throat is severe. She describes the pain as burning. The patient notes associated voice change, pain with swallowing and cough with mucus production but denies any difficulty swallowing, shortness of breath, fever, chills, nausea or vomiting. The pain worsens with swallowing. She notes she was evaluated by her PCP for the symptoms yesterday and instructed to use OTC medications. The patient has used medications with minimal improvement of symptoms. She denies any sick contacts.       The history is provided by the patient.     Review of patient's allergies indicates:   Allergen Reactions    Tobramycin Other (See Comments)     Red eye,      Past Medical History:   Diagnosis Date    Anxiety     IBS (irritable bowel syndrome)      Past Surgical History:   Procedure Laterality Date     SECTION, LOW TRANSVERSE       No family history on file.  Social History   Substance Use Topics    Smoking status: Never Smoker    Smokeless tobacco: Never Used    Alcohol use No     Review of Systems   Constitutional: Negative for chills and fever.   HENT: Positive for sore throat and voice change. Negative for trouble swallowing.    Respiratory: Positive for cough.    All other systems reviewed and are negative.      Physical Exam     Initial Vitals [18 0432]   BP Pulse Resp Temp SpO2   (!) 169/81 84 18 98.3 °F (36.8 °C) 98 %      MAP        --         Physical Exam    Nursing note and vitals reviewed.  Constitutional: She appears well-developed and well-nourished.   HENT:   Head: Normocephalic and atraumatic.   Mouth/Throat: No oropharyngeal exudate.   Cobblestoning of posterior oropharynx. No exudates. Swollen and erythematous nasal turbinates   Eyes: EOM are normal.   Neck: Normal range of motion. Neck supple.   Pulmonary/Chest: Breath sounds normal. No respiratory distress.   Abdominal: Soft.   Musculoskeletal: Normal range of motion.   Lymphadenopathy:     She has no cervical adenopathy.   Neurological: She is alert and oriented to person, place, and time.   Skin: Skin is warm and dry.   Psychiatric: She has a normal mood and affect.         ED Course   Procedures  Labs Reviewed - No data to display       Imaging Results    None          Medical Decision Making:   ED Management:  Nontoxic-appearing patient with apparent viral pharyngitis.  With this is worsening despite treatment with over-the-counter medications.  Rapid strep screen performed by primary care is negative; culture still pending.  We will continue to treat symptomatically.  Patient is nontoxic appearing in the ED.  No persistent emergent issues detected.  Exam benign.  We will discharge home to follow up with primary care.  Patient will return to the ED as needed for any deterioration or any other concerns.  Verbal discharge instructions and return precautions given.                       Clinical Impression:     1. Pharyngitis, unspecified etiology         I, Cam Almanzar, personally performed the services described in this documentation. All medical record entries made by the scribe were at my direction and in my presence.  I have reviewed the chart and agree that the record reflects my personal performance and is accurate and complete within the limitations of emergency medical charting. Cam Almanzar M.D.                 Cam Almanzar MD  07/22/18 [id number]

## 2018-07-22 NOTE — ED NOTES
Patient identifiers for Kaylee Clement checked and correct.  LOC: The patient is awake, alert and aware of environment with an appropriate affect, the patient is oriented x 3 and speaking appropriately.  APPEARANCE: Patient uncomfortable and in no acute distress, patient is clean and well groomed, patient's clothing are properly fastened.  SKIN: The skin is warm and dry, patient has normal skin turgor and moist mucus membranes. Throat slightly red.  MUSKULOSKELETAL: Patient moving all extremities well, no obvious swelling or deformities noted.  RESPIRATORY: Airway is open and patent, respirations are spontaneous, patient has a normal effort and rate.

## 2018-07-23 LAB — BACTERIA THROAT CULT: NORMAL

## 2018-12-11 ENCOUNTER — HOSPITAL ENCOUNTER (EMERGENCY)
Facility: HOSPITAL | Age: 48
Discharge: HOME | End: 2018-12-11
Attending: EMERGENCY MEDICINE
Payer: COMMERCIAL

## 2018-12-11 VITALS
DIASTOLIC BLOOD PRESSURE: 94 MMHG | WEIGHT: 122 LBS | OXYGEN SATURATION: 100 % | TEMPERATURE: 98 F | HEIGHT: 59 IN | HEART RATE: 95 BPM | RESPIRATION RATE: 16 BRPM | BODY MASS INDEX: 24.6 KG/M2 | SYSTOLIC BLOOD PRESSURE: 162 MMHG

## 2018-12-11 DIAGNOSIS — M25.552 HIP PAIN, ACUTE, LEFT: Primary | ICD-10-CM

## 2018-12-11 LAB
B-HCG UR QL: NEGATIVE
CTP QC/QA: YES

## 2018-12-11 PROCEDURE — 99284 EMERGENCY DEPT VISIT MOD MDM: CPT | Mod: 25

## 2018-12-11 PROCEDURE — 81025 URINE PREGNANCY TEST: CPT | Performed by: EMERGENCY MEDICINE

## 2018-12-11 RX ORDER — METHOCARBAMOL 750 MG/1
1500 TABLET, FILM COATED ORAL 3 TIMES DAILY
Qty: 30 TABLET | Refills: 0 | Status: SHIPPED | OUTPATIENT
Start: 2018-12-11 | End: 2018-12-16

## 2018-12-11 RX ORDER — IBUPROFEN 600 MG/1
600 TABLET ORAL EVERY 6 HOURS PRN
Qty: 20 TABLET | Refills: 0 | Status: SHIPPED | OUTPATIENT
Start: 2018-12-11 | End: 2019-07-30

## 2018-12-11 NOTE — ED PROVIDER NOTES
Encounter Date: 2018    SCRIBE #1 NOTE: I, Andrea Toussaint, am scribing for, and in the presence of,  Dr. Dimas Agosto. I have scribed the entire note.       History     Chief Complaint   Patient presents with    Leg Pain     left upper l;eg pain that radiates down leg.  No dysuria.  No injury.     Kaylee Clement is a 48 y.o. female who  has a past medical history of Anxiety and IBS (irritable bowel syndrome).    The patient presents to the ED for evaluation of left leg pain that began yesterday evening. Patient reports shooting left upper leg pain which radiates down the leg, after she stood up from the floor from a sitting position. No recent injury or trauma. She denies any right leg pain, calf pain, abdominal pain, dysuria, or any other complaints. She reports history of left hip bursitis.      The history is provided by the patient.     Review of patient's allergies indicates:   Allergen Reactions    Tobramycin Other (See Comments)     Red eye,      Past Medical History:   Diagnosis Date    Anxiety     IBS (irritable bowel syndrome)      Past Surgical History:   Procedure Laterality Date     SECTION, LOW TRANSVERSE       History reviewed. No pertinent family history.  Social History     Tobacco Use    Smoking status: Never Smoker    Smokeless tobacco: Never Used   Substance Use Topics    Alcohol use: No    Drug use: No     Review of Systems   Constitutional: Negative for chills and fever.   HENT: Negative for congestion, rhinorrhea and sore throat.    Eyes: Negative for redness and visual disturbance.   Respiratory: Negative for cough, shortness of breath and wheezing.    Cardiovascular: Negative for chest pain and palpitations.   Gastrointestinal: Negative for abdominal pain, diarrhea, nausea and vomiting.   Genitourinary: Negative for dysuria and hematuria.   Musculoskeletal: Negative for back pain, myalgias and neck pain.        + left leg pain   Skin: Negative for rash.    Neurological: Negative for dizziness, weakness and light-headedness.   Psychiatric/Behavioral: Negative for confusion.   All other systems reviewed and are negative.      Physical Exam     Initial Vitals [12/11/18 1212]   BP Pulse Resp Temp SpO2   (!) 162/94 95 16 98.4 °F (36.9 °C) 100 %      MAP       --         Physical Exam    Nursing note and vitals reviewed.  Constitutional: She appears well-developed and well-nourished. No distress.   HENT:   Head: Normocephalic and atraumatic.   Eyes: Conjunctivae and EOM are normal. Pupils are equal, round, and reactive to light.   Neck: Normal range of motion. Neck supple.   Cardiovascular: Normal rate, regular rhythm, normal heart sounds and intact distal pulses.   No murmur heard.  Pulmonary/Chest: Breath sounds normal. No respiratory distress. She has no wheezes. She has no rales.   Abdominal: Soft. She exhibits no distension and no mass. There is no tenderness.   Musculoskeletal: She exhibits tenderness. She exhibits no edema.   Tenderness over greater trochanter of left hip.  Left thigh and calf is soft; non-tender.  No calf swelling.   Negative Susan's sign.  No lower lumbar paraspinal muscle tenderness.  Negative straight leg raise.  Left foot with palpable pulses.   Neurological: She is alert and oriented to person, place, and time. No sensory deficit.   Skin: Skin is warm and dry.         ED Course   Procedures  Labs Reviewed   POCT URINE PREGNANCY          Imaging Results          X-Ray Hip 2 View Left (Final result)  Result time 12/11/18 12:57:13    Final result by Andrew Callahan MD (12/11/18 12:57:13)                 Impression:      No significant degenerative changes.No evidence for fracture.      Electronically signed by: Andrew Callahan MD  Date:    12/11/2018  Time:    12:57             Narrative:    EXAMINATION:  XR HIP 2 VIEW LEFT    CLINICAL HISTORY:  hip pain;    TECHNIQUE:  Two view examination of theLEFT  hip    COMPARISON:  None.    FINDINGS:  The alignment is within normal limits.  No displaced fractures identified.  No evidence of lytic or blastic lesions.Joint spaces and soft tissues are unremarkable.                                 Medical Decision Making:   Clinical Tests:   Lab Tests: Ordered and Reviewed  Radiological Study: Ordered and Reviewed  ED Management:  48-year-old female with left hip pain radiating down her thigh first felt when she got up from a seated position on the floor yesterday.  Hip x-rays is unremarkable. Patient signs of a DVT.  She is neurovascularly intact. I will place her on ibuprofen and Robaxin.  She will follow up with her primary physician if not showing improvement in 1 week.                      Clinical Impression:     1. Hip pain, acute, left            Disposition:   Disposition: Discharged  Condition: Stable       I, Dr. Dimas Gilmore, personally performed the services described in this documentation. All medical record entries made by the scribe were at my direction and in my presence. I have reviewed the chart and agree that the record reflects my personal performance and is accurate and complete. Dimas Gilmore MD.  6:01 PM 12/11/2018                   Dimas Gilmore MD  12/11/18 1801

## 2018-12-11 NOTE — ED NOTES
Patient states sitting cross leg playing with child and now left upper leg to knee pain intermittent when standing to knee, then tingling at knee. Ambulatory gait steady to bed 22. Pulses present distal, color appropriate, skin warm dry, no swelling noted

## 2019-04-08 ENCOUNTER — PATIENT OUTREACH (OUTPATIENT)
Dept: ADMINISTRATIVE | Facility: HOSPITAL | Age: 49
End: 2019-04-08

## 2019-07-30 ENCOUNTER — LAB VISIT (OUTPATIENT)
Dept: LAB | Facility: HOSPITAL | Age: 49
End: 2019-07-30
Attending: INTERNAL MEDICINE
Payer: COMMERCIAL

## 2019-07-30 ENCOUNTER — OFFICE VISIT (OUTPATIENT)
Dept: INTERNAL MEDICINE | Facility: CLINIC | Age: 49
End: 2019-07-30
Payer: COMMERCIAL

## 2019-07-30 VITALS
HEART RATE: 96 BPM | HEIGHT: 59 IN | SYSTOLIC BLOOD PRESSURE: 118 MMHG | WEIGHT: 119 LBS | DIASTOLIC BLOOD PRESSURE: 80 MMHG | BODY MASS INDEX: 23.99 KG/M2 | OXYGEN SATURATION: 98 %

## 2019-07-30 DIAGNOSIS — R33.9 INCOMPLETE EMPTYING OF BLADDER: ICD-10-CM

## 2019-07-30 DIAGNOSIS — K59.00 CONSTIPATION, UNSPECIFIED CONSTIPATION TYPE: ICD-10-CM

## 2019-07-30 DIAGNOSIS — N39.3 SUI (STRESS URINARY INCONTINENCE, FEMALE): ICD-10-CM

## 2019-07-30 DIAGNOSIS — R41.840 ATTENTION DEFICIT: ICD-10-CM

## 2019-07-30 DIAGNOSIS — F43.23 ADJUSTMENT DISORDER WITH MIXED ANXIETY AND DEPRESSED MOOD: ICD-10-CM

## 2019-07-30 DIAGNOSIS — R03.0 ELEVATED BLOOD PRESSURE READING: ICD-10-CM

## 2019-07-30 DIAGNOSIS — R03.0 ELEVATED BLOOD PRESSURE READING: Primary | ICD-10-CM

## 2019-07-30 LAB
ALBUMIN SERPL BCP-MCNC: 3.8 G/DL (ref 3.5–5.2)
ALP SERPL-CCNC: 58 U/L (ref 55–135)
ALT SERPL W/O P-5'-P-CCNC: 13 U/L (ref 10–44)
ANION GAP SERPL CALC-SCNC: 8 MMOL/L (ref 8–16)
AST SERPL-CCNC: 19 U/L (ref 10–40)
BASOPHILS # BLD AUTO: 0.05 K/UL (ref 0–0.2)
BASOPHILS NFR BLD: 0.5 % (ref 0–1.9)
BILIRUB SERPL-MCNC: 0.5 MG/DL (ref 0.1–1)
BUN SERPL-MCNC: 9 MG/DL (ref 6–20)
CALCIUM SERPL-MCNC: 9.4 MG/DL (ref 8.7–10.5)
CHLORIDE SERPL-SCNC: 105 MMOL/L (ref 95–110)
CO2 SERPL-SCNC: 26 MMOL/L (ref 23–29)
CREAT SERPL-MCNC: 0.8 MG/DL (ref 0.5–1.4)
DIFFERENTIAL METHOD: ABNORMAL
EOSINOPHIL # BLD AUTO: 0.2 K/UL (ref 0–0.5)
EOSINOPHIL NFR BLD: 2.2 % (ref 0–8)
ERYTHROCYTE [DISTWIDTH] IN BLOOD BY AUTOMATED COUNT: 12.4 % (ref 11.5–14.5)
EST. GFR  (AFRICAN AMERICAN): >60 ML/MIN/1.73 M^2
EST. GFR  (NON AFRICAN AMERICAN): >60 ML/MIN/1.73 M^2
GLUCOSE SERPL-MCNC: 93 MG/DL (ref 70–110)
HCT VFR BLD AUTO: 44.3 % (ref 37–48.5)
HGB BLD-MCNC: 13.9 G/DL (ref 12–16)
IMM GRANULOCYTES # BLD AUTO: 0.03 K/UL (ref 0–0.04)
IMM GRANULOCYTES NFR BLD AUTO: 0.3 % (ref 0–0.5)
LYMPHOCYTES # BLD AUTO: 2.9 K/UL (ref 1–4.8)
LYMPHOCYTES NFR BLD: 30.1 % (ref 18–48)
MCH RBC QN AUTO: 29.2 PG (ref 27–31)
MCHC RBC AUTO-ENTMCNC: 31.4 G/DL (ref 32–36)
MCV RBC AUTO: 93 FL (ref 82–98)
MONOCYTES # BLD AUTO: 0.8 K/UL (ref 0.3–1)
MONOCYTES NFR BLD: 8.6 % (ref 4–15)
NEUTROPHILS # BLD AUTO: 5.6 K/UL (ref 1.8–7.7)
NEUTROPHILS NFR BLD: 58.3 % (ref 38–73)
NRBC BLD-RTO: 0 /100 WBC
PLATELET # BLD AUTO: 350 K/UL (ref 150–350)
PMV BLD AUTO: 10.6 FL (ref 9.2–12.9)
POTASSIUM SERPL-SCNC: 4.1 MMOL/L (ref 3.5–5.1)
PROT SERPL-MCNC: 6.9 G/DL (ref 6–8.4)
RBC # BLD AUTO: 4.76 M/UL (ref 4–5.4)
SODIUM SERPL-SCNC: 139 MMOL/L (ref 136–145)
TSH SERPL DL<=0.005 MIU/L-ACNC: 1.49 UIU/ML (ref 0.4–4)
WBC # BLD AUTO: 9.62 K/UL (ref 3.9–12.7)

## 2019-07-30 PROCEDURE — 3008F PR BODY MASS INDEX (BMI) DOCUMENTED: ICD-10-PCS | Mod: CPTII,S$GLB,, | Performed by: INTERNAL MEDICINE

## 2019-07-30 PROCEDURE — 80053 COMPREHEN METABOLIC PANEL: CPT

## 2019-07-30 PROCEDURE — 99999 PR PBB SHADOW E&M-EST. PATIENT-LVL III: CPT | Mod: PBBFAC,,, | Performed by: INTERNAL MEDICINE

## 2019-07-30 PROCEDURE — 99214 PR OFFICE/OUTPT VISIT, EST, LEVL IV, 30-39 MIN: ICD-10-PCS | Mod: S$GLB,,, | Performed by: INTERNAL MEDICINE

## 2019-07-30 PROCEDURE — 84443 ASSAY THYROID STIM HORMONE: CPT

## 2019-07-30 PROCEDURE — 99214 OFFICE O/P EST MOD 30 MIN: CPT | Mod: S$GLB,,, | Performed by: INTERNAL MEDICINE

## 2019-07-30 PROCEDURE — 99999 PR PBB SHADOW E&M-EST. PATIENT-LVL III: ICD-10-PCS | Mod: PBBFAC,,, | Performed by: INTERNAL MEDICINE

## 2019-07-30 PROCEDURE — 3008F BODY MASS INDEX DOCD: CPT | Mod: CPTII,S$GLB,, | Performed by: INTERNAL MEDICINE

## 2019-07-30 PROCEDURE — 36415 COLL VENOUS BLD VENIPUNCTURE: CPT | Mod: PO

## 2019-07-30 PROCEDURE — 85025 COMPLETE CBC W/AUTO DIFF WBC: CPT

## 2019-07-30 RX ORDER — DULOXETIN HYDROCHLORIDE 30 MG/1
30 CAPSULE, DELAYED RELEASE ORAL DAILY
Qty: 30 CAPSULE | Refills: 1 | Status: SHIPPED | OUTPATIENT
Start: 2019-07-30 | End: 2019-09-23 | Stop reason: SDUPTHER

## 2019-07-30 RX ORDER — CETIRIZINE HYDROCHLORIDE 10 MG/1
10 TABLET ORAL DAILY
COMMUNITY

## 2019-07-30 RX ORDER — DESONIDE 0.5 MG/G
CREAM TOPICAL
Refills: 0 | COMMUNITY
Start: 2019-07-15 | End: 2019-07-30

## 2019-07-30 RX ORDER — DOCUSATE SODIUM 100 MG/1
100 CAPSULE, LIQUID FILLED ORAL 2 TIMES DAILY
COMMUNITY
End: 2020-07-06

## 2019-07-30 NOTE — PROGRESS NOTES
REASON FOR VISIT:  This is a 48-year-old female.  Primary reason for this   appointment is problems with anxiety and issues of being focused and concern   with attention difficulties.    She started a new job at [employer] that requires a lot of   reading and learning new things.  She finds that she has had inability to stay   focused and concentrating on the details at hand, but however, back in December,   she underwent lumbar surgery for which she had complications afterwards for   which she was out of work for three months.  She still has issues from the   complications and has led to emotional issues of being anxious and upset   regarding the circumstances what has occurred.    In December 2018, she started having radicular pain involving her left leg with   burning sensation in the buttocks that traveled across the leg to the frontal   medial aspect.  She had an MRI that showed a herniated disk at left L3-L4 and on   December 21st underwent a foraminotomy, laminotomy and decompressive diskectomy   at that level.  However, after the surgery, she had loss of feeling involving   her left leg associated with a footdrop and loss of the use of the leg.  She   required the use of a brace involving the leg and knee as well as ankle and foot   and underwent physical therapy.  It was around in April that she started   getting resuming function in the leg, which she is able to move it, although she   still has a slight degree of left foot drop and sometimes the leg drags.    She has ongoing loss of feeling involving the posterior aspect of the lower leg   at the calf.  The entire left foot other than the big toe and tingling   discomfort in the left buttock.    Other issue is her bowel function.  Prior to that, she will have a regular bowel   function every day, but since the operation, she has had ongoing problems with   constipation where she has to take Colace twice a week.  She might have a bowel    function two to three days out of the week.  When she feels the urge to   defecate, she cannot and she might defecate a day after taking the Colace.    There was an episode on July 2nd that she had all of a sudden fecal incontinence   while stool, a lot of it watery and liquid without any warning and it came on.    The other issue is with urination.  Since that time, she has noticed stress   urine incontinence whenever she coughs or sneezes and at times when she feels   that she does not empty completely such that after urinating, she might feel she   has to go back again 10 minutes, although there is no urgency or urge   incontinence.    She is also having low back pain.    She met with Dr. Fair at [facility] for which she is ordered   x-rays and MRI and decision will be made afterwards as far as what to do.    PAST MEDICAL HISTORY:  Anxiety.  Irritable bowel.    CURRENT MEDICATIONS:  Zyrtec 10 mg a day.  Colace 100 mg twice a week.    Review of the chart back in 2016 and 2017, she was on the medicine Lexapro.    PHYSICAL EXAMINATION:  VITAL SIGNS:  Weight is 119 pounds, pulse rate is 96, blood pressure by me   152/80.  NECK:  No thyromegaly.  LUNGS:  Clear breath sounds.  HEART:  Regular rate and rhythm.  ABDOMEN:  Active bowel sounds, soft, uncomfortable in the hypogastric region.  PULSES:  2+ carotid pulses, 2+ right dorsalis pedis pulse, 1+ left dorsalis   pedis pulse.  Left foot is cool.  She has 2+ right knee reflex, absent left knee   reflex.  Trace to 1+ right ankle reflex.  Negative left ankle reflex.    IMPRESSION:  1. Elevated blood pressure.  2. Adjustment disorder with mixed depress and anxious features.  3. Attention deficit.  4. 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  5. Stress urine incontinence or incomplete emptying and possible neurogenic   bladder.  6. Constipation.    PLAN:  Routine labs today.  Trial of Cymbalta starting at 30 mg a day.  Refer to    Urology.  Increase water and fluid intake.  Consider taking in place of Colace   also MiraLax twice a week.  Time voiding throughout the day was discussed and   phone review to follow up.        NATHANIEL  dd: 07/30/2019 14:33:50 (CDT)  td: 07/31/2019 02:46:16 (CDT)  Doc ID   #[id number]  Job ID #[id number]    CC:

## 2019-07-31 ENCOUNTER — PATIENT MESSAGE (OUTPATIENT)
Dept: INTERNAL MEDICINE | Facility: CLINIC | Age: 49
End: 2019-07-31

## 2019-07-31 NOTE — PROGRESS NOTES
The lab testing looked fine    For now would like to see how you do with the medication duloxetine  Let me know in a couple weeks on how you are feeling with the medication and to get back with me after you have the MRI and what was noted

## 2019-08-21 ENCOUNTER — OFFICE VISIT (OUTPATIENT)
Dept: INTERNAL MEDICINE | Facility: CLINIC | Age: 49
End: 2019-08-21
Payer: COMMERCIAL

## 2019-08-21 VITALS
DIASTOLIC BLOOD PRESSURE: 98 MMHG | OXYGEN SATURATION: 98 % | SYSTOLIC BLOOD PRESSURE: 156 MMHG | BODY MASS INDEX: 23.78 KG/M2 | HEIGHT: 59 IN | HEART RATE: 67 BPM | WEIGHT: 117.94 LBS

## 2019-08-21 DIAGNOSIS — I10 ESSENTIAL HYPERTENSION: Primary | ICD-10-CM

## 2019-08-21 PROCEDURE — 93005 EKG 12-LEAD: ICD-10-PCS | Mod: S$GLB,,, | Performed by: INTERNAL MEDICINE

## 2019-08-21 PROCEDURE — 99214 PR OFFICE/OUTPT VISIT, EST, LEVL IV, 30-39 MIN: ICD-10-PCS | Mod: S$GLB,,, | Performed by: INTERNAL MEDICINE

## 2019-08-21 PROCEDURE — 99999 PR PBB SHADOW E&M-EST. PATIENT-LVL III: CPT | Mod: PBBFAC,,, | Performed by: INTERNAL MEDICINE

## 2019-08-21 PROCEDURE — 99214 OFFICE O/P EST MOD 30 MIN: CPT | Mod: S$GLB,,, | Performed by: INTERNAL MEDICINE

## 2019-08-21 PROCEDURE — 93005 ELECTROCARDIOGRAM TRACING: CPT | Mod: S$GLB,,, | Performed by: INTERNAL MEDICINE

## 2019-08-21 PROCEDURE — 3008F BODY MASS INDEX DOCD: CPT | Mod: CPTII,S$GLB,, | Performed by: INTERNAL MEDICINE

## 2019-08-21 PROCEDURE — 3008F PR BODY MASS INDEX (BMI) DOCUMENTED: ICD-10-PCS | Mod: CPTII,S$GLB,, | Performed by: INTERNAL MEDICINE

## 2019-08-21 PROCEDURE — 93010 EKG 12-LEAD: ICD-10-PCS | Mod: S$GLB,,, | Performed by: INTERNAL MEDICINE

## 2019-08-21 PROCEDURE — 99999 PR PBB SHADOW E&M-EST. PATIENT-LVL III: ICD-10-PCS | Mod: PBBFAC,,, | Performed by: INTERNAL MEDICINE

## 2019-08-21 PROCEDURE — 93010 ELECTROCARDIOGRAM REPORT: CPT | Mod: S$GLB,,, | Performed by: INTERNAL MEDICINE

## 2019-08-21 RX ORDER — GABAPENTIN 300 MG/1
300 CAPSULE ORAL 3 TIMES DAILY
COMMUNITY
Start: 2019-08-20 | End: 2021-07-17

## 2019-08-21 RX ORDER — VALSARTAN 80 MG/1
80 TABLET ORAL DAILY
Qty: 30 TABLET | Refills: 5 | Status: SHIPPED | OUTPATIENT
Start: 2019-08-21 | End: 2020-03-13 | Stop reason: SDUPTHER

## 2019-08-21 NOTE — PROGRESS NOTES
REASON FOR VISIT:  This is a 48-year-old female who is here to address her blood   pressure.  She has been consistently getting blood pressure readings of   systolic 150 to 180 and diastolic 90 to 105 at work.  When she was seen by me on   July 30th, her blood pressure was slightly elevated, but she was in a very much   agitated state due to circumstances regarding recent lumbar surgery.    In regard to the lumbar surgery, she met with Dr. Fair of [facility].  Through an MRI study, it was felt that there might be a trapped nerve   root going to the left leg resulting from scar tissue from the previous lumbar   surgery.  She is not ready to pursue surgery again from mental aspect.  She is   now on gabapentin 300 mg three times a day, which was started yesterday and   actually from her visit with me.  Cymbalta 30 mg a day was added and she   certainly finds this has been helping her from an emotional standpoint, not   being as down or as anxious.    She reports no chest pain or shortness of breath.    PAST MEDICAL HISTORY:  Irritable bowel.  Anxiety.    PHYSICAL EXAMINATION:  VITAL SIGNS:  Weight is 117, pulse rate 76, blood pressure by my assistant   156/98, by me in the right arm 144/84, left arm 142/80.  LUNGS:  Clear.  HEART:  Regular rate and rhythm.    IMPRESSION:  Hypertension.    PLAN:  We will start losartan 25 mg a day.  She will keep me informed of her   progress.  Electrocardiogram today to make sure there is no significant change.    From her visit of July 30th, a TSH, chemistry, and CBC were normal.  There was   consideration to being placed on a beta blocker, but right now she is not having   any tachycardia.        JAM/HN  dd: 08/21/2019 16:38:01 (CDT)  td: 08/22/2019 05:10:36 (CDT)  Doc ID   #[id number]  Job ID #[id number]    CC:

## 2019-09-11 DIAGNOSIS — Z12.39 BREAST CANCER SCREENING: ICD-10-CM

## 2019-09-24 RX ORDER — DULOXETIN HYDROCHLORIDE 30 MG/1
30 CAPSULE, DELAYED RELEASE ORAL DAILY
Qty: 30 CAPSULE | Refills: 5 | Status: SHIPPED | OUTPATIENT
Start: 2019-09-24 | End: 2020-03-25 | Stop reason: SDUPTHER

## 2020-01-27 ENCOUNTER — PATIENT OUTREACH (OUTPATIENT)
Dept: ADMINISTRATIVE | Facility: HOSPITAL | Age: 50
End: 2020-01-27

## 2020-03-20 RX ORDER — VALSARTAN 80 MG/1
80 TABLET ORAL DAILY
Qty: 90 TABLET | Refills: 1 | Status: SHIPPED | OUTPATIENT
Start: 2020-03-20 | End: 2020-10-05 | Stop reason: SDUPTHER

## 2020-03-20 NOTE — TELEPHONE ENCOUNTER
Patient notified medication not available at her pharmacy. Medication was available at another local [facility] and medication was sent there. Patient verbalized understanding.

## 2020-03-20 NOTE — TELEPHONE ENCOUNTER
----- Message from Shelby Small sent at 3/20/2020  7:49 AM CDT -----  Contact: Pt self Mobile/Home [phone]   Requesting an RX refill or new RX.  Is this a refill or new RX:  Refill  RX name and strength: valsartan (DIOVAN) 80 MG tablet  Directions (copy/paste from chart):  N/A  Is this a 30 day or 90 day RX: 30   Local pharmacy or mail order pharmacy:  [id number] - [place] - [address] AT [facility]  Pharmacy name and phone #    [phone], fax# [phone]   Comments:  Patient said that she called a week ago to have her script filled.

## 2020-03-20 NOTE — TELEPHONE ENCOUNTER
Spoke with patients pharmacy, medication (Valsartan 80 mg) is on back order. Please advise on how to proceed.

## 2020-06-22 ENCOUNTER — PATIENT OUTREACH (OUTPATIENT)
Dept: ADMINISTRATIVE | Facility: HOSPITAL | Age: 50
End: 2020-06-22

## 2020-07-06 ENCOUNTER — OFFICE VISIT (OUTPATIENT)
Dept: INTERNAL MEDICINE | Facility: CLINIC | Age: 50
End: 2020-07-06
Payer: COMMERCIAL

## 2020-07-06 ENCOUNTER — LAB VISIT (OUTPATIENT)
Dept: LAB | Facility: HOSPITAL | Age: 50
End: 2020-07-06
Attending: INTERNAL MEDICINE
Payer: COMMERCIAL

## 2020-07-06 VITALS
DIASTOLIC BLOOD PRESSURE: 82 MMHG | HEIGHT: 59 IN | HEART RATE: 73 BPM | SYSTOLIC BLOOD PRESSURE: 118 MMHG | WEIGHT: 125 LBS | OXYGEN SATURATION: 98 % | BODY MASS INDEX: 25.2 KG/M2

## 2020-07-06 DIAGNOSIS — M47.27 LUMBOSACRAL SPONDYLOSIS WITH RADICULOPATHY: ICD-10-CM

## 2020-07-06 DIAGNOSIS — I10 ESSENTIAL HYPERTENSION: ICD-10-CM

## 2020-07-06 DIAGNOSIS — M79.605 LEG PAIN, DIFFUSE, LEFT: ICD-10-CM

## 2020-07-06 DIAGNOSIS — M79.605 LEG PAIN, DIFFUSE, LEFT: Primary | ICD-10-CM

## 2020-07-06 DIAGNOSIS — R25.2 LEG CRAMPS: ICD-10-CM

## 2020-07-06 DIAGNOSIS — M54.9 DORSALGIA, UNSPECIFIED: ICD-10-CM

## 2020-07-06 PROCEDURE — 36415 COLL VENOUS BLD VENIPUNCTURE: CPT

## 2020-07-06 PROCEDURE — 3079F PR MOST RECENT DIASTOLIC BLOOD PRESSURE 80-89 MM HG: ICD-10-PCS | Mod: CPTII,S$GLB,, | Performed by: INTERNAL MEDICINE

## 2020-07-06 PROCEDURE — 82550 ASSAY OF CK (CPK): CPT

## 2020-07-06 PROCEDURE — 3074F SYST BP LT 130 MM HG: CPT | Mod: CPTII,S$GLB,, | Performed by: INTERNAL MEDICINE

## 2020-07-06 PROCEDURE — 3008F BODY MASS INDEX DOCD: CPT | Mod: CPTII,S$GLB,, | Performed by: INTERNAL MEDICINE

## 2020-07-06 PROCEDURE — 3074F PR MOST RECENT SYSTOLIC BLOOD PRESSURE < 130 MM HG: ICD-10-PCS | Mod: CPTII,S$GLB,, | Performed by: INTERNAL MEDICINE

## 2020-07-06 PROCEDURE — 99999 PR PBB SHADOW E&M-EST. PATIENT-LVL III: ICD-10-PCS | Mod: PBBFAC,,, | Performed by: INTERNAL MEDICINE

## 2020-07-06 PROCEDURE — 99214 OFFICE O/P EST MOD 30 MIN: CPT | Mod: S$GLB,,, | Performed by: INTERNAL MEDICINE

## 2020-07-06 PROCEDURE — 85025 COMPLETE CBC W/AUTO DIFF WBC: CPT

## 2020-07-06 PROCEDURE — 83735 ASSAY OF MAGNESIUM: CPT

## 2020-07-06 PROCEDURE — 99214 PR OFFICE/OUTPT VISIT, EST, LEVL IV, 30-39 MIN: ICD-10-PCS | Mod: S$GLB,,, | Performed by: INTERNAL MEDICINE

## 2020-07-06 PROCEDURE — 99999 PR PBB SHADOW E&M-EST. PATIENT-LVL III: CPT | Mod: PBBFAC,,, | Performed by: INTERNAL MEDICINE

## 2020-07-06 PROCEDURE — 80048 BASIC METABOLIC PNL TOTAL CA: CPT

## 2020-07-06 PROCEDURE — 3079F DIAST BP 80-89 MM HG: CPT | Mod: CPTII,S$GLB,, | Performed by: INTERNAL MEDICINE

## 2020-07-06 PROCEDURE — 3008F PR BODY MASS INDEX (BMI) DOCUMENTED: ICD-10-PCS | Mod: CPTII,S$GLB,, | Performed by: INTERNAL MEDICINE

## 2020-07-06 PROCEDURE — 84443 ASSAY THYROID STIM HORMONE: CPT

## 2020-07-06 PROCEDURE — 82306 VITAMIN D 25 HYDROXY: CPT

## 2020-07-06 RX ORDER — METHOCARBAMOL 500 MG/1
500 TABLET, FILM COATED ORAL 3 TIMES DAILY
Qty: 30 TABLET | Refills: 0 | Status: SHIPPED | OUTPATIENT
Start: 2020-07-06 | End: 2020-07-16

## 2020-07-06 RX ORDER — GABAPENTIN 100 MG/1
CAPSULE ORAL
COMMUNITY
Start: 2020-06-23 | End: 2021-07-17

## 2020-07-06 NOTE — PROGRESS NOTES
49-year-old female    For 2-3 weeks, been feeling pain which is described as cramping, or spasm mean, or like a charley horse pain we the involved the left lower buttocks the left upper hamstring or left upper calf     more notice when she is lying down in the evening and any attempts she makes a  stretch out the leg it gets worse    She has had a longstanding problem with the left leg since having lumbar surgery December 2018 which was the laminectomy and diskectomy at L3 due to herniated disc    After which she has had chronic numbness and weakness involving the legs associated with also a left drop foot    She has undergone physical therapy but she still has chronic numbness involving the left foot and left posterior calf as well as relative weakness involving the leg although it has improved    In August 2019 she had a Mon lumbar spine in which the report showed left L3-4 diskectomy without recurring disc herniation there was no spinal stenosis or disc herniation but the neurosurgeon she start the time stated that there was impingement of the nerve root and could recommend surgery but this has not been followed up on or elected   there was evidence of facet arthropathy      Past medical history  Hypertension  Lumbar spondylosis  Irritable bowel  Anxiety      Medication  Gabapentin 300 mg twice a day  Cymbalta 30 mg  Valsartan 80 mg     Examination  Weight 125 lb  Pulse 72  Blood pressure 120/72  Reflexes  2+ right knee absent left knee  1+ right ankle, absent left ankle    Motor strength normal in the right leg  Has 3/5 motor strength with dorsiflexion of the left foot, extension of the leg at the left knee and flexion leg at the left hip    Surgical scar noted in the lumbar region which she will have some degree of pain or discomfort when I palpate over the left side    Impression  Leg cramps/spastic leg pain  Lumbar spondylosis with chronic radiculopathy  Hypertension    Plan  CBC, basic metabolic  profile, vitamin-D, magnesium, CPK  Trial methocarbamol 500 mg 3 times a day  Radiographs of the left and lumbar vertebrae with flexion-extension views    Answers for HPI/ROS submitted by the patient on 7/4/2020   Back pain  Chronicity: recurrent  Onset: more than 1 month ago  Frequency: constantly  Progression since onset: unchanged  Pain location: sacro-iliac  Pain quality: aching  Radiates to: does not radiate  Pain - numeric: 8/10  Pain is: worse during the night  Aggravated by: position  Stiffness is present: all day  abdominal pain: No  bladder incontinence: No  chest pain: No  dysuria: No  headaches: No  leg pain: Yes  paresis: No  paresthesias: Yes  perianal numbness: No  tingling: No  weight loss: No  genital pain: No  Pain severity: moderate  Treatments tried: analgesics, NSAIDs, bed rest, heat, home exercises, ice, muscle relaxant, walking  Improvement on treatment: mild

## 2020-07-07 ENCOUNTER — HOSPITAL ENCOUNTER (OUTPATIENT)
Dept: RADIOLOGY | Facility: HOSPITAL | Age: 50
Discharge: HOME | End: 2020-07-07
Attending: INTERNAL MEDICINE
Payer: COMMERCIAL

## 2020-07-07 DIAGNOSIS — M47.27 LUMBOSACRAL SPONDYLOSIS WITH RADICULOPATHY: ICD-10-CM

## 2020-07-07 DIAGNOSIS — M79.605 LEG PAIN, DIFFUSE, LEFT: ICD-10-CM

## 2020-07-07 DIAGNOSIS — R25.2 LEG CRAMPS: ICD-10-CM

## 2020-07-07 DIAGNOSIS — M54.9 DORSALGIA, UNSPECIFIED: ICD-10-CM

## 2020-07-07 LAB
25(OH)D3+25(OH)D2 SERPL-MCNC: 35 NG/ML (ref 30–96)
ANION GAP SERPL CALC-SCNC: 7 MMOL/L (ref 8–16)
BASOPHILS # BLD AUTO: 0.05 K/UL (ref 0–0.2)
BASOPHILS NFR BLD: 0.6 % (ref 0–1.9)
BUN SERPL-MCNC: 11 MG/DL (ref 6–20)
CALCIUM SERPL-MCNC: 9.2 MG/DL (ref 8.7–10.5)
CHLORIDE SERPL-SCNC: 105 MMOL/L (ref 95–110)
CK SERPL-CCNC: 257 U/L (ref 20–180)
CO2 SERPL-SCNC: 26 MMOL/L (ref 23–29)
CREAT SERPL-MCNC: 0.8 MG/DL (ref 0.5–1.4)
DIFFERENTIAL METHOD: ABNORMAL
EOSINOPHIL # BLD AUTO: 0.2 K/UL (ref 0–0.5)
EOSINOPHIL NFR BLD: 2.8 % (ref 0–8)
ERYTHROCYTE [DISTWIDTH] IN BLOOD BY AUTOMATED COUNT: 13.1 % (ref 11.5–14.5)
EST. GFR  (AFRICAN AMERICAN): >60 ML/MIN/1.73 M^2
EST. GFR  (NON AFRICAN AMERICAN): >60 ML/MIN/1.73 M^2
GLUCOSE SERPL-MCNC: 92 MG/DL (ref 70–110)
HCT VFR BLD AUTO: 45.5 % (ref 37–48.5)
HGB BLD-MCNC: 13.9 G/DL (ref 12–16)
IMM GRANULOCYTES # BLD AUTO: 0.01 K/UL (ref 0–0.04)
IMM GRANULOCYTES NFR BLD AUTO: 0.1 % (ref 0–0.5)
LYMPHOCYTES # BLD AUTO: 3 K/UL (ref 1–4.8)
LYMPHOCYTES NFR BLD: 35.3 % (ref 18–48)
MAGNESIUM SERPL-MCNC: 2.1 MG/DL (ref 1.6–2.6)
MCH RBC QN AUTO: 29.6 PG (ref 27–31)
MCHC RBC AUTO-ENTMCNC: 30.5 G/DL (ref 32–36)
MCV RBC AUTO: 97 FL (ref 82–98)
MONOCYTES # BLD AUTO: 0.9 K/UL (ref 0.3–1)
MONOCYTES NFR BLD: 10.7 % (ref 4–15)
NEUTROPHILS # BLD AUTO: 4.3 K/UL (ref 1.8–7.7)
NEUTROPHILS NFR BLD: 50.5 % (ref 38–73)
NRBC BLD-RTO: 0 /100 WBC
PLATELET # BLD AUTO: 325 K/UL (ref 150–350)
PMV BLD AUTO: 10.7 FL (ref 9.2–12.9)
POTASSIUM SERPL-SCNC: 4.5 MMOL/L (ref 3.5–5.1)
RBC # BLD AUTO: 4.69 M/UL (ref 4–5.4)
SODIUM SERPL-SCNC: 138 MMOL/L (ref 136–145)
TSH SERPL DL<=0.005 MIU/L-ACNC: 1.82 UIU/ML (ref 0.4–4)
WBC # BLD AUTO: 8.49 K/UL (ref 3.9–12.7)

## 2020-07-07 PROCEDURE — 73521 X-RAY EXAM HIPS BI 2 VIEWS: CPT | Mod: 26,,, | Performed by: RADIOLOGY

## 2020-07-07 PROCEDURE — 72100 XR LUMBAR SPINE AP AND LATERAL: ICD-10-PCS | Mod: 26,,, | Performed by: RADIOLOGY

## 2020-07-07 PROCEDURE — 72100 X-RAY EXAM L-S SPINE 2/3 VWS: CPT | Mod: 26,,, | Performed by: RADIOLOGY

## 2020-07-07 PROCEDURE — 73521 X-RAY EXAM HIPS BI 2 VIEWS: CPT | Mod: TC

## 2020-07-07 PROCEDURE — 72100 X-RAY EXAM L-S SPINE 2/3 VWS: CPT | Mod: TC

## 2020-07-07 PROCEDURE — 73521 XR HIPS BILATERAL 2 VIEW INCL AP PELVIS: ICD-10-PCS | Mod: 26,,, | Performed by: RADIOLOGY

## 2020-07-07 NOTE — PROGRESS NOTES
Lab studies and x-rays reviewed    The CPK was elevated which I feel is regarding chronic strain involving the left leg muscles any x-rays to sit chest left pelvic tilt    Offered the option of re-evaluating her lumbar situation with repeat MRI as well as referral to physical therapy    She elected to hold off on this for now, I described leg exercises that can be done and to use of methocarbamol    She will keep me informed

## 2020-07-24 ENCOUNTER — OFFICE VISIT (OUTPATIENT)
Dept: INTERNAL MEDICINE | Facility: CLINIC | Age: 50
End: 2020-07-24
Payer: COMMERCIAL

## 2020-07-24 ENCOUNTER — PATIENT OUTREACH (OUTPATIENT)
Dept: ADMINISTRATIVE | Facility: HOSPITAL | Age: 50
End: 2020-07-24

## 2020-07-24 VITALS
SYSTOLIC BLOOD PRESSURE: 124 MMHG | WEIGHT: 123 LBS | DIASTOLIC BLOOD PRESSURE: 80 MMHG | HEIGHT: 59 IN | HEART RATE: 77 BPM | BODY MASS INDEX: 24.8 KG/M2 | OXYGEN SATURATION: 98 %

## 2020-07-24 DIAGNOSIS — M70.62 TROCHANTERIC BURSITIS OF LEFT HIP: Primary | ICD-10-CM

## 2020-07-24 DIAGNOSIS — Z11.59 NEED FOR HEPATITIS C SCREENING TEST: Primary | ICD-10-CM

## 2020-07-24 PROCEDURE — 3008F PR BODY MASS INDEX (BMI) DOCUMENTED: ICD-10-PCS | Mod: CPTII,S$GLB,, | Performed by: INTERNAL MEDICINE

## 2020-07-24 PROCEDURE — 3074F PR MOST RECENT SYSTOLIC BLOOD PRESSURE < 130 MM HG: ICD-10-PCS | Mod: CPTII,S$GLB,, | Performed by: INTERNAL MEDICINE

## 2020-07-24 PROCEDURE — 99999 PR PBB SHADOW E&M-EST. PATIENT-LVL III: ICD-10-PCS | Mod: PBBFAC,,, | Performed by: INTERNAL MEDICINE

## 2020-07-24 PROCEDURE — 96372 PR INJECTION,THERAP/PROPH/DIAG2ST, IM OR SUBCUT: ICD-10-PCS | Mod: S$GLB,,, | Performed by: INTERNAL MEDICINE

## 2020-07-24 PROCEDURE — 99214 PR OFFICE/OUTPT VISIT, EST, LEVL IV, 30-39 MIN: ICD-10-PCS | Mod: 25,S$GLB,, | Performed by: INTERNAL MEDICINE

## 2020-07-24 PROCEDURE — 3079F DIAST BP 80-89 MM HG: CPT | Mod: CPTII,S$GLB,, | Performed by: INTERNAL MEDICINE

## 2020-07-24 PROCEDURE — 3074F SYST BP LT 130 MM HG: CPT | Mod: CPTII,S$GLB,, | Performed by: INTERNAL MEDICINE

## 2020-07-24 PROCEDURE — 96372 THER/PROPH/DIAG INJ SC/IM: CPT | Mod: S$GLB,,, | Performed by: INTERNAL MEDICINE

## 2020-07-24 PROCEDURE — 3079F PR MOST RECENT DIASTOLIC BLOOD PRESSURE 80-89 MM HG: ICD-10-PCS | Mod: CPTII,S$GLB,, | Performed by: INTERNAL MEDICINE

## 2020-07-24 PROCEDURE — 99214 OFFICE O/P EST MOD 30 MIN: CPT | Mod: 25,S$GLB,, | Performed by: INTERNAL MEDICINE

## 2020-07-24 PROCEDURE — 3008F BODY MASS INDEX DOCD: CPT | Mod: CPTII,S$GLB,, | Performed by: INTERNAL MEDICINE

## 2020-07-24 PROCEDURE — 99999 PR PBB SHADOW E&M-EST. PATIENT-LVL III: CPT | Mod: PBBFAC,,, | Performed by: INTERNAL MEDICINE

## 2020-07-24 RX ORDER — METHYLPREDNISOLONE 4 MG/1
TABLET ORAL
Qty: 1 PACKAGE | Refills: 0 | Status: SHIPPED | OUTPATIENT
Start: 2020-07-24 | End: 2021-07-17

## 2020-07-24 RX ORDER — DICLOFENAC SODIUM 75 MG/1
75 TABLET, DELAYED RELEASE ORAL 2 TIMES DAILY
Qty: 20 TABLET | Refills: 0 | Status: SHIPPED | OUTPATIENT
Start: 2020-07-24 | End: 2020-08-03

## 2020-07-24 RX ORDER — KETOROLAC TROMETHAMINE 30 MG/ML
60 INJECTION, SOLUTION INTRAMUSCULAR; INTRAVENOUS
Status: COMPLETED | OUTPATIENT
Start: 2020-07-24 | End: 2020-07-24

## 2020-07-24 RX ADMIN — KETOROLAC TROMETHAMINE 60 MG: 30 INJECTION, SOLUTION INTRAMUSCULAR; INTRAVENOUS at 04:07

## 2020-07-24 NOTE — PROGRESS NOTES
Administered Toradol 60mg to the right dorsal gluteal, tolerated well, no c/o pain noted, no adverse reaction noted within wait period.

## 2020-07-24 NOTE — PROGRESS NOTES
49-year-old female    Presents with pain in her left upper lateral thigh    Been low-grade for while but for the past 3 days it has gotten worse    And reference to a last to office visit she has been experiencing posterior left buttocks and leg pain from circumstances regarding lumbar surgery a year ago contact felt to be due to ongoing muscular strain.  She has been doing special leg exercises.  The current pain is different.  Recently had a pelvic x-ray that did not reveal arthritis in the hip joint but there was a left pelvic tilt    Examination vital signs per epic  2+ right knee trace left knee reflexes 1+ bilateral ankle reflex  Negative straight leg raise sign  There is no pain with abduction and left leg it joint  She is tender over the greater trochanter    Impression trochanteric bursitis     plan    Medrol Dosepak  Toradol 60 mg IM  Diclofenac simple mg b.i.d. for 10 days any use of nice\

## 2020-10-06 RX ORDER — VALSARTAN 80 MG/1
80 TABLET ORAL DAILY
Qty: 90 TABLET | Refills: 1 | Status: SHIPPED | OUTPATIENT
Start: 2020-10-06 | End: 2021-04-12 | Stop reason: SDUPTHER

## 2020-10-06 RX ORDER — VALSARTAN 80 MG/1
TABLET ORAL
Qty: 90 TABLET | Refills: 1 | OUTPATIENT
Start: 2020-10-06

## 2020-10-07 ENCOUNTER — PATIENT MESSAGE (OUTPATIENT)
Dept: ADMINISTRATIVE | Facility: HOSPITAL | Age: 50
End: 2020-10-07

## 2021-01-04 ENCOUNTER — PATIENT MESSAGE (OUTPATIENT)
Dept: ADMINISTRATIVE | Facility: HOSPITAL | Age: 51
End: 2021-01-04

## 2021-02-22 ENCOUNTER — OFFICE VISIT (OUTPATIENT)
Dept: URGENT CARE | Facility: CLINIC | Age: 51
End: 2021-02-22
Payer: COMMERCIAL

## 2021-02-22 VITALS
DIASTOLIC BLOOD PRESSURE: 74 MMHG | RESPIRATION RATE: 16 BRPM | BODY MASS INDEX: 25.4 KG/M2 | HEIGHT: 59 IN | WEIGHT: 126 LBS | OXYGEN SATURATION: 99 % | TEMPERATURE: 98 F | SYSTOLIC BLOOD PRESSURE: 128 MMHG | HEART RATE: 72 BPM

## 2021-02-22 DIAGNOSIS — T24.531A: Primary | ICD-10-CM

## 2021-02-22 PROCEDURE — 3008F BODY MASS INDEX DOCD: CPT | Mod: CPTII,S$GLB,, | Performed by: PHYSICIAN ASSISTANT

## 2021-02-22 PROCEDURE — 99213 OFFICE O/P EST LOW 20 MIN: CPT | Mod: S$GLB,,, | Performed by: PHYSICIAN ASSISTANT

## 2021-02-22 PROCEDURE — 3008F PR BODY MASS INDEX (BMI) DOCUMENTED: ICD-10-PCS | Mod: CPTII,S$GLB,, | Performed by: PHYSICIAN ASSISTANT

## 2021-02-22 PROCEDURE — 99213 PR OFFICE/OUTPT VISIT, EST, LEVL III, 20-29 MIN: ICD-10-PCS | Mod: S$GLB,,, | Performed by: PHYSICIAN ASSISTANT

## 2021-02-22 RX ORDER — MUPIROCIN 20 MG/G
OINTMENT TOPICAL 2 TIMES DAILY
Qty: 15 G | Refills: 0 | Status: SHIPPED | OUTPATIENT
Start: 2021-02-22

## 2021-04-05 ENCOUNTER — PATIENT MESSAGE (OUTPATIENT)
Dept: ADMINISTRATIVE | Facility: HOSPITAL | Age: 51
End: 2021-04-05

## 2021-04-12 RX ORDER — VALSARTAN 80 MG/1
80 TABLET ORAL DAILY
Qty: 90 TABLET | Refills: 1 | Status: SHIPPED | OUTPATIENT
Start: 2021-04-12 | End: 2021-10-13

## 2021-04-12 RX ORDER — DULOXETIN HYDROCHLORIDE 30 MG/1
30 CAPSULE, DELAYED RELEASE ORAL DAILY
Qty: 90 CAPSULE | Refills: 1 | Status: SHIPPED | OUTPATIENT
Start: 2021-04-12 | End: 2021-08-13 | Stop reason: SDUPTHER

## 2021-07-06 ENCOUNTER — PATIENT MESSAGE (OUTPATIENT)
Dept: ADMINISTRATIVE | Facility: HOSPITAL | Age: 51
End: 2021-07-06

## 2021-07-11 ENCOUNTER — PATIENT OUTREACH (OUTPATIENT)
Dept: ADMINISTRATIVE | Facility: HOSPITAL | Age: 51
End: 2021-07-11

## 2021-07-11 ENCOUNTER — PATIENT MESSAGE (OUTPATIENT)
Dept: ADMINISTRATIVE | Facility: HOSPITAL | Age: 51
End: 2021-07-11

## 2021-07-16 ENCOUNTER — PATIENT MESSAGE (OUTPATIENT)
Dept: INTERNAL MEDICINE | Facility: CLINIC | Age: 51
End: 2021-07-16

## 2021-08-12 ENCOUNTER — PATIENT MESSAGE (OUTPATIENT)
Dept: INTERNAL MEDICINE | Facility: CLINIC | Age: 51
End: 2021-08-12

## 2021-08-13 RX ORDER — DULOXETIN HYDROCHLORIDE 30 MG/1
30 CAPSULE, DELAYED RELEASE ORAL DAILY
Qty: 90 CAPSULE | Refills: 1 | Status: SHIPPED | OUTPATIENT
Start: 2021-08-13 | End: 2022-01-18 | Stop reason: SDUPTHER

## 2021-10-05 ENCOUNTER — PATIENT MESSAGE (OUTPATIENT)
Dept: ADMINISTRATIVE | Facility: HOSPITAL | Age: 51
End: 2021-10-05

## 2021-10-13 RX ORDER — VALSARTAN 80 MG/1
TABLET ORAL
Qty: 90 TABLET | Refills: 1 | Status: SHIPPED | OUTPATIENT
Start: 2021-10-13 | End: 2022-01-18 | Stop reason: SDUPTHER

## 2022-01-20 RX ORDER — DULOXETIN HYDROCHLORIDE 30 MG/1
30 CAPSULE, DELAYED RELEASE ORAL DAILY
Qty: 90 CAPSULE | Refills: 1 | Status: SHIPPED | OUTPATIENT
Start: 2022-01-20 | End: 2022-05-09 | Stop reason: SDUPTHER

## 2022-01-20 NOTE — TELEPHONE ENCOUNTER
No new care gaps identified.  Powered by [employer]. Reference number: [id number].   1/20/2022 3:24:33 PM CST

## 2022-01-26 ENCOUNTER — PATIENT MESSAGE (OUTPATIENT)
Dept: ADMINISTRATIVE | Facility: HOSPITAL | Age: 52
End: 2022-01-26
Payer: COMMERCIAL

## 2022-01-26 DIAGNOSIS — Z12.31 OTHER SCREENING MAMMOGRAM: ICD-10-CM

## 2022-05-30 ENCOUNTER — PATIENT MESSAGE (OUTPATIENT)
Dept: ADMINISTRATIVE | Facility: HOSPITAL | Age: 52
End: 2022-05-30
Payer: COMMERCIAL

## 2022-07-11 ENCOUNTER — PATIENT MESSAGE (OUTPATIENT)
Dept: ADMINISTRATIVE | Facility: HOSPITAL | Age: 52
End: 2022-07-11
Payer: COMMERCIAL

## 2022-11-02 NOTE — TELEPHONE ENCOUNTER
No new care gaps identified.  [facility] Embedded Care Gaps. Reference number: [id number]. 11/02/2022   1:41:09 PM CDT

## 2022-11-03 ENCOUNTER — PATIENT MESSAGE (OUTPATIENT)
Dept: INTERNAL MEDICINE | Facility: CLINIC | Age: 52
End: 2022-11-03
Payer: COMMERCIAL

## 2022-11-03 RX ORDER — VALSARTAN 80 MG/1
80 TABLET ORAL DAILY
Qty: 90 TABLET | Refills: 1 | Status: SHIPPED | OUTPATIENT
Start: 2022-11-03

## 2022-12-14 NOTE — PROGRESS NOTES
medical history  Hypertension  Lumbar spondylosis  Irritable bowel  Anxiety        Medication    Cymbalta 30 mg  Valsartan 80 mg       52-year-old female       Comes in for regular follow-up visit.  She has been working in [place] and is currently in town.  She lease back to [place] 5 days    First she knows that she has always fidgety, anxious, agitated.  Cymbalta which was prescribed about 5 years ago was doing well but now she does not think it works as well.  She has difficulty going to sleep.  Can not relax can sleeping now on.  Our off.  She still able to function during the day was chronically for take    The other ongoing issue is chronic low left leg pain.  Two thousand eighteen underwent diskectomy for herniated disc.  At that time it relieved the pain in her back and buttocks that went down all in aspect of his leg but after the surgery she had a new pain going down the posterior aspect of the lingula leg associated with foot drop she has an AFO brace that she does not use regularly but it has been helpful.  She will also get jumping sensation involving the leg and feels a bad pain in the anterior shin at times.  She has had follow-up MRIs there did not show any acute issues.  She has been involved in physical therapy.  She attempted gabapentin but it caused significant sedation    Review of symptoms  Negative for chest pain, palpitations, shortness of breath, abdominal pain.  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

## 2022-12-15 ENCOUNTER — OFFICE VISIT (OUTPATIENT)
Dept: INTERNAL MEDICINE | Facility: CLINIC | Age: 52
End: 2022-12-15
Payer: COMMERCIAL

## 2022-12-15 VITALS
OXYGEN SATURATION: 98 % | BODY MASS INDEX: 27.12 KG/M2 | HEART RATE: 107 BPM | SYSTOLIC BLOOD PRESSURE: 142 MMHG | DIASTOLIC BLOOD PRESSURE: 80 MMHG | HEIGHT: 59 IN | WEIGHT: 134.5 LBS

## 2022-12-15 DIAGNOSIS — G57.32 NEUROPATHY OF LEFT PERONEAL NERVE: ICD-10-CM

## 2022-12-15 DIAGNOSIS — M54.16 CHRONIC LUMBAR RADICULOPATHY: ICD-10-CM

## 2022-12-15 DIAGNOSIS — F41.9 ANXIETY: ICD-10-CM

## 2022-12-15 DIAGNOSIS — I10 PRIMARY HYPERTENSION: Primary | ICD-10-CM

## 2022-12-15 PROCEDURE — 99214 OFFICE O/P EST MOD 30 MIN: CPT | Mod: S$GLB,,, | Performed by: INTERNAL MEDICINE

## 2022-12-15 PROCEDURE — 3077F SYST BP >= 140 MM HG: CPT | Mod: CPTII,S$GLB,, | Performed by: INTERNAL MEDICINE

## 2022-12-15 PROCEDURE — 99999 PR PBB SHADOW E&M-EST. PATIENT-LVL IV: ICD-10-PCS | Mod: PBBFAC,,, | Performed by: INTERNAL MEDICINE

## 2022-12-15 PROCEDURE — 3079F PR MOST RECENT DIASTOLIC BLOOD PRESSURE 80-89 MM HG: ICD-10-PCS | Mod: CPTII,S$GLB,, | Performed by: INTERNAL MEDICINE

## 2022-12-15 PROCEDURE — 1160F PR REVIEW ALL MEDS BY PRESCRIBER/CLIN PHARMACIST DOCUMENTED: ICD-10-PCS | Mod: CPTII,S$GLB,, | Performed by: INTERNAL MEDICINE

## 2022-12-15 PROCEDURE — 1159F MED LIST DOCD IN RCRD: CPT | Mod: CPTII,S$GLB,, | Performed by: INTERNAL MEDICINE

## 2022-12-15 PROCEDURE — 3077F PR MOST RECENT SYSTOLIC BLOOD PRESSURE >= 140 MM HG: ICD-10-PCS | Mod: CPTII,S$GLB,, | Performed by: INTERNAL MEDICINE

## 2022-12-15 PROCEDURE — 1160F RVW MEDS BY RX/DR IN RCRD: CPT | Mod: CPTII,S$GLB,, | Performed by: INTERNAL MEDICINE

## 2022-12-15 PROCEDURE — 3008F PR BODY MASS INDEX (BMI) DOCUMENTED: ICD-10-PCS | Mod: CPTII,S$GLB,, | Performed by: INTERNAL MEDICINE

## 2022-12-15 PROCEDURE — 3079F DIAST BP 80-89 MM HG: CPT | Mod: CPTII,S$GLB,, | Performed by: INTERNAL MEDICINE

## 2022-12-15 PROCEDURE — 1159F PR MEDICATION LIST DOCUMENTED IN MEDICAL RECORD: ICD-10-PCS | Mod: CPTII,S$GLB,, | Performed by: INTERNAL MEDICINE

## 2022-12-15 PROCEDURE — 99999 PR PBB SHADOW E&M-EST. PATIENT-LVL IV: CPT | Mod: PBBFAC,,, | Performed by: INTERNAL MEDICINE

## 2022-12-15 PROCEDURE — 3008F BODY MASS INDEX DOCD: CPT | Mod: CPTII,S$GLB,, | Performed by: INTERNAL MEDICINE

## 2022-12-15 PROCEDURE — 99214 PR OFFICE/OUTPT VISIT, EST, LEVL IV, 30-39 MIN: ICD-10-PCS | Mod: S$GLB,,, | Performed by: INTERNAL MEDICINE

## 2022-12-15 RX ORDER — ATENOLOL 25 MG/1
25 TABLET ORAL DAILY
Qty: 30 TABLET | Refills: 0 | Status: SHIPPED | OUTPATIENT
Start: 2022-12-15 | End: 2023-01-09

## 2022-12-15 RX ORDER — VENLAFAXINE 37.5 MG/1
37.5 TABLET ORAL 2 TIMES DAILY
Qty: 60 TABLET | Refills: 1 | Status: SHIPPED | OUTPATIENT
Start: 2022-12-15 | End: 2023-03-03 | Stop reason: SDUPTHER

## 2022-12-16 ENCOUNTER — LAB VISIT (OUTPATIENT)
Dept: LAB | Facility: HOSPITAL | Age: 52
End: 2022-12-16
Attending: INTERNAL MEDICINE
Payer: COMMERCIAL

## 2022-12-16 DIAGNOSIS — G57.32 NEUROPATHY OF LEFT PERONEAL NERVE: ICD-10-CM

## 2022-12-16 DIAGNOSIS — M54.16 CHRONIC LUMBAR RADICULOPATHY: ICD-10-CM

## 2022-12-16 DIAGNOSIS — F41.9 ANXIETY: ICD-10-CM

## 2022-12-16 DIAGNOSIS — I10 PRIMARY HYPERTENSION: ICD-10-CM

## 2022-12-16 LAB
ALBUMIN SERPL BCP-MCNC: 3.8 G/DL (ref 3.5–5.2)
ALP SERPL-CCNC: 77 U/L (ref 55–135)
ALT SERPL W/O P-5'-P-CCNC: 23 U/L (ref 10–44)
ANION GAP SERPL CALC-SCNC: 8 MMOL/L (ref 8–16)
AST SERPL-CCNC: 25 U/L (ref 10–40)
BASOPHILS # BLD AUTO: 0.05 K/UL (ref 0–0.2)
BASOPHILS NFR BLD: 0.5 % (ref 0–1.9)
BILIRUB SERPL-MCNC: 0.3 MG/DL (ref 0.1–1)
BUN SERPL-MCNC: 15 MG/DL (ref 6–20)
CALCIUM SERPL-MCNC: 9.6 MG/DL (ref 8.7–10.5)
CHLORIDE SERPL-SCNC: 102 MMOL/L (ref 95–110)
CHOLEST SERPL-MCNC: 202 MG/DL (ref 120–199)
CHOLEST/HDLC SERPL: 3.6 {RATIO} (ref 2–5)
CO2 SERPL-SCNC: 27 MMOL/L (ref 23–29)
CREAT SERPL-MCNC: 0.9 MG/DL (ref 0.5–1.4)
DIFFERENTIAL METHOD: ABNORMAL
EOSINOPHIL # BLD AUTO: 0.4 K/UL (ref 0–0.5)
EOSINOPHIL NFR BLD: 4.2 % (ref 0–8)
ERYTHROCYTE [DISTWIDTH] IN BLOOD BY AUTOMATED COUNT: 12.9 % (ref 11.5–14.5)
EST. GFR  (NO RACE VARIABLE): >60 ML/MIN/1.73 M^2
GLUCOSE SERPL-MCNC: 70 MG/DL (ref 70–110)
HCT VFR BLD AUTO: 44.4 % (ref 37–48.5)
HDLC SERPL-MCNC: 56 MG/DL (ref 40–75)
HDLC SERPL: 27.7 % (ref 20–50)
HGB BLD-MCNC: 14.1 G/DL (ref 12–16)
IMM GRANULOCYTES # BLD AUTO: 0.02 K/UL (ref 0–0.04)
IMM GRANULOCYTES NFR BLD AUTO: 0.2 % (ref 0–0.5)
LDLC SERPL CALC-MCNC: 117.4 MG/DL (ref 63–159)
LYMPHOCYTES # BLD AUTO: 3.7 K/UL (ref 1–4.8)
LYMPHOCYTES NFR BLD: 39.7 % (ref 18–48)
MAGNESIUM SERPL-MCNC: 2.2 MG/DL (ref 1.6–2.6)
MCH RBC QN AUTO: 29.2 PG (ref 27–31)
MCHC RBC AUTO-ENTMCNC: 31.8 G/DL (ref 32–36)
MCV RBC AUTO: 92 FL (ref 82–98)
MONOCYTES # BLD AUTO: 0.7 K/UL (ref 0.3–1)
MONOCYTES NFR BLD: 8 % (ref 4–15)
NEUTROPHILS # BLD AUTO: 4.4 K/UL (ref 1.8–7.7)
NEUTROPHILS NFR BLD: 47.4 % (ref 38–73)
NONHDLC SERPL-MCNC: 146 MG/DL
NRBC BLD-RTO: 0 /100 WBC
PLATELET # BLD AUTO: 429 K/UL (ref 150–450)
PMV BLD AUTO: 9.8 FL (ref 9.2–12.9)
POTASSIUM SERPL-SCNC: 4.2 MMOL/L (ref 3.5–5.1)
PROT SERPL-MCNC: 7.3 G/DL (ref 6–8.4)
RBC # BLD AUTO: 4.83 M/UL (ref 4–5.4)
SODIUM SERPL-SCNC: 137 MMOL/L (ref 136–145)
TRIGL SERPL-MCNC: 143 MG/DL (ref 30–150)
WBC # BLD AUTO: 9.21 K/UL (ref 3.9–12.7)

## 2022-12-16 PROCEDURE — 84443 ASSAY THYROID STIM HORMONE: CPT | Performed by: INTERNAL MEDICINE

## 2022-12-16 PROCEDURE — 36415 COLL VENOUS BLD VENIPUNCTURE: CPT | Performed by: INTERNAL MEDICINE

## 2022-12-16 PROCEDURE — 85025 COMPLETE CBC W/AUTO DIFF WBC: CPT | Performed by: INTERNAL MEDICINE

## 2022-12-16 PROCEDURE — 80053 COMPREHEN METABOLIC PANEL: CPT | Performed by: INTERNAL MEDICINE

## 2022-12-16 PROCEDURE — 80061 LIPID PANEL: CPT | Performed by: INTERNAL MEDICINE

## 2022-12-16 PROCEDURE — 83735 ASSAY OF MAGNESIUM: CPT | Performed by: INTERNAL MEDICINE

## 2022-12-16 PROCEDURE — 82306 VITAMIN D 25 HYDROXY: CPT | Performed by: INTERNAL MEDICINE

## 2022-12-17 ENCOUNTER — PATIENT MESSAGE (OUTPATIENT)
Dept: INTERNAL MEDICINE | Facility: CLINIC | Age: 52
End: 2022-12-17
Payer: COMMERCIAL

## 2022-12-17 LAB
25(OH)D3+25(OH)D2 SERPL-MCNC: 30 NG/ML (ref 30–96)
TSH SERPL DL<=0.005 MIU/L-ACNC: 1.66 UIU/ML (ref 0.4–4)

## 2022-12-22 RX ORDER — TIZANIDINE 4 MG/1
4 TABLET ORAL NIGHTLY
Qty: 14 TABLET | Refills: 0 | Status: SHIPPED | OUTPATIENT
Start: 2022-12-22 | End: 2023-01-06 | Stop reason: SDUPTHER

## 2023-01-06 ENCOUNTER — PATIENT MESSAGE (OUTPATIENT)
Dept: INTERNAL MEDICINE | Facility: CLINIC | Age: 53
End: 2023-01-06
Payer: COMMERCIAL

## 2023-01-06 NOTE — TELEPHONE ENCOUNTER
No new care gaps identified.  [facility] Embedded Care Gaps. Reference number: [id number]. 1/06/2023   9:57:54 AM CST

## 2023-01-09 RX ORDER — ATENOLOL 25 MG/1
25 TABLET ORAL DAILY
Qty: 90 TABLET | Refills: 1 | Status: SHIPPED | OUTPATIENT
Start: 2023-01-09 | End: 2023-06-14 | Stop reason: SDUPTHER

## 2023-01-13 ENCOUNTER — PATIENT MESSAGE (OUTPATIENT)
Dept: INTERNAL MEDICINE | Facility: CLINIC | Age: 53
End: 2023-01-13
Payer: COMMERCIAL

## 2023-01-13 RX ORDER — TIZANIDINE 4 MG/1
4 TABLET ORAL NIGHTLY
Qty: 30 TABLET | Refills: 2 | Status: SHIPPED | OUTPATIENT
Start: 2023-01-13 | End: 2023-04-19

## 2023-03-03 ENCOUNTER — PATIENT MESSAGE (OUTPATIENT)
Dept: INTERNAL MEDICINE | Facility: CLINIC | Age: 53
End: 2023-03-03
Payer: COMMERCIAL

## 2023-04-03 ENCOUNTER — PATIENT MESSAGE (OUTPATIENT)
Dept: ADMINISTRATIVE | Facility: HOSPITAL | Age: 53
End: 2023-04-03
Payer: COMMERCIAL

## 2023-06-14 RX ORDER — ATENOLOL 25 MG/1
25 TABLET ORAL DAILY
Qty: 90 TABLET | Refills: 1 | Status: SHIPPED | OUTPATIENT
Start: 2023-06-14 | End: 2023-12-05

## 2023-06-14 RX ORDER — TIZANIDINE 4 MG/1
4 TABLET ORAL NIGHTLY
Qty: 30 TABLET | Refills: 2 | Status: SHIPPED | OUTPATIENT
Start: 2023-06-14 | End: 2023-09-18

## 2023-06-14 RX ORDER — VALSARTAN 80 MG/1
80 TABLET ORAL DAILY
Qty: 90 TABLET | Refills: 1 | Status: SHIPPED | OUTPATIENT
Start: 2023-06-14 | End: 2023-12-13

## 2023-06-14 NOTE — TELEPHONE ENCOUNTER
No care due was identified.  [facility] Embedded Care Due Messages. Reference number: [id number].   6/14/2023 11:33:48 AM CDT

## 2023-06-14 NOTE — TELEPHONE ENCOUNTER
No care due was identified.  [facility] Embedded Care Due Messages. Reference number: [id number].   6/14/2023 11:34:25 AM CDT

## 2023-06-14 NOTE — TELEPHONE ENCOUNTER
No care due was identified.  [facility] Embedded Care Due Messages. Reference number: [id number].   6/14/2023 11:34:46 AM CDT

## 2023-09-16 NOTE — TELEPHONE ENCOUNTER
Care Due:                  Date            Visit Type   Department     Provider  --------------------------------------------------------------------------------                                EP -                              PRIMARY      [facility] PRIMARY  Last Visit: 12-      CARE ([facility])   ALYSA Melo  Next Visit: None Scheduled  None         None Found                                                            Last  Test          Frequency    Reason                     Performed    Due Date  --------------------------------------------------------------------------------    Office Visit  12 months..  atenoloL, valsartan,       12-   12-                             venlafaxine..............    CMP.........  12 months..  valsartan, venlafaxine...  12- 12-    [facility] Embedded Care Due Messages. Reference number: [id number].   9/16/2023 12:05:10 PM CDT

## 2023-09-18 RX ORDER — TIZANIDINE 4 MG/1
4 TABLET ORAL NIGHTLY
Qty: 30 TABLET | Refills: 2 | Status: SHIPPED | OUTPATIENT
Start: 2023-09-18 | End: 2023-12-27 | Stop reason: SDUPTHER

## 2023-12-05 RX ORDER — ATENOLOL 25 MG/1
25 TABLET ORAL DAILY
Qty: 90 TABLET | Refills: 1 | Status: SHIPPED | OUTPATIENT
Start: 2023-12-05

## 2023-12-05 NOTE — TELEPHONE ENCOUNTER
Care Due:                  Date            Visit Type   Department     Provider  --------------------------------------------------------------------------------                                EP -                              PRIMARY      [facility] PRIMARY  Last Visit: 12-      CARE ([facility])   ALYSA Melo  Next Visit: None Scheduled  None         None Found                                                            Last  Test          Frequency    Reason                     Performed    Due Date  --------------------------------------------------------------------------------    CMP.........  12 months..  valsartan, venlafaxine...  12- 12-    [facility] Embedded Care Due Messages. Reference number: [id number].   12/05/2023 11:57:51 AM CST

## 2023-12-05 NOTE — TELEPHONE ENCOUNTER
Refill Routing Note   Medication(s) are not appropriate for processing by [facility] for the following reason(s):        Required vitals abnormal    [facility] action(s):  Defer     Requires labs : Yes             Appointments  past 12m or future 3m with PCP    Date Provider   Last Visit   12/15/2022 Jonathan Melo MD   Next Visit   Visit date not found Jonathan Melo MD   ED visits in past 90 days: 0        Note composed:4:43 PM 12/05/2023

## 2023-12-13 RX ORDER — VALSARTAN 80 MG/1
80 TABLET ORAL DAILY
Qty: 90 TABLET | Refills: 1 | Status: SHIPPED | OUTPATIENT
Start: 2023-12-13

## 2023-12-13 NOTE — TELEPHONE ENCOUNTER
Care Due:                  Date            Visit Type   Department     Provider  --------------------------------------------------------------------------------                                EP -                              PRIMARY      [facility] PRIMARY  Last Visit: 12-      CARE ([facility])   ALYSA Melo  Next Visit: None Scheduled  None         None Found                                                            Last  Test          Frequency    Reason                     Performed    Due Date  --------------------------------------------------------------------------------    Office Visit  15 months..  atenoloL, valsartan,       12-   03-                             venlafaxine..............    [facility] Embedded Care Due Messages. Reference number: [id number].   12/13/2023 11:17:51 AM CST

## 2023-12-13 NOTE — TELEPHONE ENCOUNTER
Refill Routing Note   Medication(s) are not appropriate for processing by [facility] for the following reason(s):        Required labs outdated  Required vitals outdated    [facility] action(s):  Defer   Requires appointment : Yes               Appointments  past 12m or future 3m with PCP    Date Provider   Last Visit   12/15/2022 Jonathan Melo MD   Next Visit   Visit date not found Jonathan Melo MD   ED visits in past 90 days: 0        Note composed:11:57 AM 12/13/2023

## 2023-12-27 RX ORDER — TIZANIDINE 4 MG/1
4 TABLET ORAL NIGHTLY
Qty: 30 TABLET | Refills: 2 | Status: SHIPPED | OUTPATIENT
Start: 2023-12-27 | End: 2024-02-28

## 2023-12-27 NOTE — TELEPHONE ENCOUNTER
No care due was identified.  [facility] Embedded Care Due Messages. Reference number: [id number].   12/27/2023 1:31:09 PM CST

## 2023-12-27 NOTE — TELEPHONE ENCOUNTER
Lov 12/2022, said she's run out of medication. Pended med and pharmacy.    Called to schedule appt, no answer, left voicemail and sent portal msg.

## 2024-01-31 DIAGNOSIS — Z12.31 OTHER SCREENING MAMMOGRAM: ICD-10-CM

## 2024-02-27 ENCOUNTER — PATIENT MESSAGE (OUTPATIENT)
Dept: INTERNAL MEDICINE | Facility: CLINIC | Age: 54
End: 2024-02-27
Payer: COMMERCIAL

## 2024-02-27 RX ORDER — VENLAFAXINE 37.5 MG/1
37.5 TABLET ORAL 2 TIMES DAILY
Qty: 60 TABLET | Refills: 5 | Status: SHIPPED | OUTPATIENT
Start: 2024-02-27

## 2024-02-27 NOTE — TELEPHONE ENCOUNTER
No care due was identified.  [facility] Embedded Care Due Messages. Reference number: [id number].   2/27/2024 3:32:20 PM CST

## 2024-02-27 NOTE — TELEPHONE ENCOUNTER
Pt requesting med refill, second request, pended. Pt states she is having withdrawal sx     LOV with Jonathan Melo MD , 12/15/2022

## 2024-02-27 NOTE — TELEPHONE ENCOUNTER
Care Due:                  Date            Visit Type   Department     Provider  --------------------------------------------------------------------------------                                EP -                              PRIMARY      [facility] PRIMARY  Last Visit: 12-      CARE ([facility])   ALYSA Melo  Next Visit: None Scheduled  None         None Found                                                            Last  Test          Frequency    Reason                     Performed    Due Date  --------------------------------------------------------------------------------    Office Visit  15 months..  atenoloL, valsartan,       12-   03-                             venlafaxine..............    CMP.........  12 months..  valsartan, venlafaxine...  12- 12-    [facility] Embedded Care Due Messages. Reference number: [id number].   2/26/2024 6:36:37 PM CST

## 2024-02-28 RX ORDER — TIZANIDINE 4 MG/1
4 TABLET ORAL NIGHTLY
Qty: 30 TABLET | Refills: 2 | Status: SHIPPED | OUTPATIENT
Start: 2024-02-28 | End: 2024-05-31

## 2024-02-28 NOTE — TELEPHONE ENCOUNTER
No care due was identified.  [facility] Embedded Care Due Messages. Reference number: [id number].   2/28/2024 1:00:05 PM CST

## 2024-03-17 NOTE — TELEPHONE ENCOUNTER
No care due was identified.  [facility] Embedded Care Due Messages. Reference number: [id number].   3/17/2024 8:07:10 AM CDT

## 2024-05-31 RX ORDER — TIZANIDINE 4 MG/1
4 TABLET ORAL NIGHTLY
Qty: 30 TABLET | Refills: 2 | Status: SHIPPED | OUTPATIENT
Start: 2024-05-31

## 2024-05-31 RX ORDER — VALSARTAN 80 MG/1
80 TABLET ORAL
Qty: 90 TABLET | Refills: 1 | Status: SHIPPED | OUTPATIENT
Start: 2024-05-31

## 2024-05-31 NOTE — TELEPHONE ENCOUNTER
Care Due:                  Date            Visit Type   Department     Provider  --------------------------------------------------------------------------------                                EP -                              PRIMARY      [facility] PRIMARY  Last Visit: 12-      CARE ([facility])   ALYSA Melo  Next Visit: None Scheduled  None         None Found                                                            Last  Test          Frequency    Reason                     Performed    Due Date  --------------------------------------------------------------------------------    Office Visit  15 months..  atenoloL, valsartan,       12-   03-                             venlafaxine..............    CMP.........  12 months..  valsartan, venlafaxine...  12- 12-    [facility] Embedded Care Due Messages. Reference number: [id number].   5/31/2024 8:07:10 AM CDT

## 2024-06-10 ENCOUNTER — PATIENT MESSAGE (OUTPATIENT)
Dept: INTERNAL MEDICINE | Facility: CLINIC | Age: 54
End: 2024-06-10
Payer: COMMERCIAL

## 2024-06-17 RX ORDER — ATENOLOL 25 MG/1
25 TABLET ORAL
Qty: 90 TABLET | Refills: 1 | Status: SHIPPED | OUTPATIENT
Start: 2024-06-17

## 2024-06-17 NOTE — TELEPHONE ENCOUNTER
No care due was identified.  [facility] Embedded Care Due Messages. Reference number: [id number].   6/17/2024 11:22:27 AM CDT

## 2024-08-05 ENCOUNTER — PATIENT OUTREACH (OUTPATIENT)
Dept: ADMINISTRATIVE | Facility: HOSPITAL | Age: 54
End: 2024-08-05
Payer: COMMERCIAL

## 2024-08-14 ENCOUNTER — HOSPITAL ENCOUNTER (OUTPATIENT)
Dept: RADIOLOGY | Facility: HOSPITAL | Age: 54
Discharge: HOME | End: 2024-08-14
Attending: INTERNAL MEDICINE
Payer: COMMERCIAL

## 2024-08-14 ENCOUNTER — OFFICE VISIT (OUTPATIENT)
Dept: INTERNAL MEDICINE | Facility: CLINIC | Age: 54
End: 2024-08-14
Payer: COMMERCIAL

## 2024-08-14 VITALS
OXYGEN SATURATION: 97 % | SYSTOLIC BLOOD PRESSURE: 130 MMHG | WEIGHT: 134.06 LBS | DIASTOLIC BLOOD PRESSURE: 82 MMHG | HEART RATE: 97 BPM | HEIGHT: 59 IN | BODY MASS INDEX: 27.03 KG/M2

## 2024-08-14 DIAGNOSIS — M67.959 TENDINOPATHY OF GLUTEAL REGION: ICD-10-CM

## 2024-08-14 DIAGNOSIS — M70.61 TROCHANTERIC BURSITIS OF BOTH HIPS: ICD-10-CM

## 2024-08-14 DIAGNOSIS — M25.50 ARTHRALGIA, UNSPECIFIED JOINT: ICD-10-CM

## 2024-08-14 DIAGNOSIS — M50.30 DEGENERATIVE DISC DISEASE, CERVICAL: ICD-10-CM

## 2024-08-14 DIAGNOSIS — G47.9 SLEEP DISORDER: ICD-10-CM

## 2024-08-14 DIAGNOSIS — M51.36 LUMBAR DEGENERATIVE DISC DISEASE: ICD-10-CM

## 2024-08-14 DIAGNOSIS — M70.62 TROCHANTERIC BURSITIS OF BOTH HIPS: ICD-10-CM

## 2024-08-14 DIAGNOSIS — R53.83 FATIGUE, UNSPECIFIED TYPE: Primary | ICD-10-CM

## 2024-08-14 DIAGNOSIS — R20.2 HAND PARESTHESIA: ICD-10-CM

## 2024-08-14 PROCEDURE — 3079F DIAST BP 80-89 MM HG: CPT | Mod: CPTII,S$GLB,, | Performed by: INTERNAL MEDICINE

## 2024-08-14 PROCEDURE — 73560 X-RAY EXAM OF KNEE 1 OR 2: CPT | Mod: TC,RT

## 2024-08-14 PROCEDURE — 73521 X-RAY EXAM HIPS BI 2 VIEWS: CPT | Mod: TC

## 2024-08-14 PROCEDURE — 1159F MED LIST DOCD IN RCRD: CPT | Mod: CPTII,S$GLB,, | Performed by: INTERNAL MEDICINE

## 2024-08-14 PROCEDURE — 3075F SYST BP GE 130 - 139MM HG: CPT | Mod: CPTII,S$GLB,, | Performed by: INTERNAL MEDICINE

## 2024-08-14 PROCEDURE — 99999 PR PBB SHADOW E&M-EST. PATIENT-LVL IV: CPT | Mod: PBBFAC,,, | Performed by: INTERNAL MEDICINE

## 2024-08-14 PROCEDURE — 72040 X-RAY EXAM NECK SPINE 2-3 VW: CPT | Mod: 26,,, | Performed by: RADIOLOGY

## 2024-08-14 PROCEDURE — 3008F BODY MASS INDEX DOCD: CPT | Mod: CPTII,S$GLB,, | Performed by: INTERNAL MEDICINE

## 2024-08-14 PROCEDURE — 1160F RVW MEDS BY RX/DR IN RCRD: CPT | Mod: CPTII,S$GLB,, | Performed by: INTERNAL MEDICINE

## 2024-08-14 PROCEDURE — 73521 X-RAY EXAM HIPS BI 2 VIEWS: CPT | Mod: 26,,, | Performed by: RADIOLOGY

## 2024-08-14 PROCEDURE — 99214 OFFICE O/P EST MOD 30 MIN: CPT | Mod: S$GLB,,, | Performed by: INTERNAL MEDICINE

## 2024-08-14 PROCEDURE — 72040 X-RAY EXAM NECK SPINE 2-3 VW: CPT | Mod: TC

## 2024-08-14 PROCEDURE — 4010F ACE/ARB THERAPY RXD/TAKEN: CPT | Mod: CPTII,S$GLB,, | Performed by: INTERNAL MEDICINE

## 2024-08-14 NOTE — PROGRESS NOTES
medical history  Hypertension  Lumbar spondylosis  Irritable bowel  Anxiety        Medication  Atenolol 25 mg  Valsartan 80 mg   Effexor XR 37.5 mg b.i.d.  Tizanidine 4 mg q.h.s.    53-year-old female    Presents with multiple joint complaints, fatigued, paresthesia    She states that has been ongoing for a year but it has gotten to the point where she was not a nuisance but becoming more painful    Whenever she tries to move her arm down with a up she has elbow pain which seems to be an up epitrochlear region.    For hands wrists will hurt in his she definitely feels stiff upon wakening morning it may take an hour to get better.    In his middle at night she will complain of burning paresthesia involving the hands.  She was told that she has a degenerative disc involving the neck.  She does not have persistent neck pain but on occasion neck tightness that would numbness going to the right left scapular region    Both hips hurt which is over the outer aspect of the pelvic region    She has grinding pain involving the left knee.  That has been some that has developed after she had a lumbar surgery.  She can feel a throbbing pain when sitting that has been difficult to stand up because of pain    As for his of the symptoms she was fatigued she feels that is sleep is disrupted.  She does snore at night.  She does not wake up rested and can feel the need to fall asleep when 11:00 a.m. comes.   has not noted any breathing issues pauses while breathing      It was noted that she had lumbar laminectomy December 2018.  However she had issues after was where she continued to have weakness involving the leg with a footdrop that has still a degree of for job where she was to wear AFO brace.  She had persistent cramping pain involving the leg which has improved over time but she has a feeling that the left leg that has never been right since then.    No chest pain palpitations shortness breast abdominal pain.  Bowel  urine function is fine    Examination   Weight 134  Pulse 80   Blood pressure 128/72   Neck no thyromegaly  Chest clear breath sounds   Heart regular rate rhythm  Abdominal exam is bowel sounds soft minimally uncomfortable right lower quadrant.  2+ carotid pulses no bruits 2+ dorsalis pedal pulses    2+ biceps triceps reflexes, negative Tinel sign, 2+ right knee, 1+ left knee reflex, absent ankle jerk reflex  No pain when abduct both legs at the hip joint but she was definitely tender over the left greater trochanter in his left lateral pelvic region  Minimal discomfort when I palpate over the right greater trochanter    That has no pain involving the elbows with supination pronation or no epitrochlear pain    Impression   Fatigued with sleep disturbance, rule out obstructive sleep apnea  Polyarthralgias   Probable tendinopathy elbow   Trochanteric bursitis and gluteal tendinopathy hips  Osteoarthritis knee  Paresthesia hand   Lumbar degenerative disc  Cervical degenerative

## 2024-08-15 ENCOUNTER — PATIENT MESSAGE (OUTPATIENT)
Dept: INTERNAL MEDICINE | Facility: CLINIC | Age: 54
End: 2024-08-15
Payer: COMMERCIAL

## 2024-08-15 RX ORDER — MELOXICAM 15 MG/1
15 TABLET ORAL DAILY
Qty: 30 TABLET | Refills: 0 | Status: SHIPPED | OUTPATIENT
Start: 2024-08-15

## 2024-08-15 RX ORDER — MELOXICAM 15 MG/1
15 TABLET ORAL DAILY
Qty: 30 TABLET | Refills: 0 | Status: SHIPPED | OUTPATIENT
Start: 2024-08-15 | End: 2024-08-15

## 2024-08-16 ENCOUNTER — TELEPHONE (OUTPATIENT)
Dept: SLEEP MEDICINE | Facility: OTHER | Age: 54
End: 2024-08-16
Payer: COMMERCIAL

## 2024-08-19 ENCOUNTER — PATIENT MESSAGE (OUTPATIENT)
Dept: INTERNAL MEDICINE | Facility: CLINIC | Age: 54
End: 2024-08-19
Payer: COMMERCIAL

## 2024-08-19 DIAGNOSIS — M67.90 TENDINOPATHY: ICD-10-CM

## 2024-08-19 DIAGNOSIS — R76.8 ANA POSITIVE: ICD-10-CM

## 2024-08-19 DIAGNOSIS — M25.50 POLYARTHRALGIA: Primary | ICD-10-CM

## 2024-08-27 ENCOUNTER — OFFICE VISIT (OUTPATIENT)
Dept: RHEUMATOLOGY | Facility: CLINIC | Age: 54
End: 2024-08-27
Payer: COMMERCIAL

## 2024-08-27 VITALS
HEIGHT: 59 IN | DIASTOLIC BLOOD PRESSURE: 74 MMHG | HEART RATE: 55 BPM | SYSTOLIC BLOOD PRESSURE: 109 MMHG | WEIGHT: 132.5 LBS | BODY MASS INDEX: 26.71 KG/M2

## 2024-08-27 DIAGNOSIS — M25.50 POLYARTHRALGIA: ICD-10-CM

## 2024-08-27 DIAGNOSIS — M15.9 PRIMARY OSTEOARTHRITIS INVOLVING MULTIPLE JOINTS: ICD-10-CM

## 2024-08-27 DIAGNOSIS — M67.90 TENDINOPATHY: ICD-10-CM

## 2024-08-27 DIAGNOSIS — M79.7 FIBROMYALGIA: Primary | ICD-10-CM

## 2024-08-27 DIAGNOSIS — R76.8 ANA POSITIVE: ICD-10-CM

## 2024-08-27 PROCEDURE — 4010F ACE/ARB THERAPY RXD/TAKEN: CPT | Mod: CPTII,S$GLB,, | Performed by: INTERNAL MEDICINE

## 2024-08-27 PROCEDURE — 1159F MED LIST DOCD IN RCRD: CPT | Mod: CPTII,S$GLB,, | Performed by: INTERNAL MEDICINE

## 2024-08-27 PROCEDURE — 3008F BODY MASS INDEX DOCD: CPT | Mod: CPTII,S$GLB,, | Performed by: INTERNAL MEDICINE

## 2024-08-27 PROCEDURE — 1160F RVW MEDS BY RX/DR IN RCRD: CPT | Mod: CPTII,S$GLB,, | Performed by: INTERNAL MEDICINE

## 2024-08-27 PROCEDURE — 3074F SYST BP LT 130 MM HG: CPT | Mod: CPTII,S$GLB,, | Performed by: INTERNAL MEDICINE

## 2024-08-27 PROCEDURE — 3078F DIAST BP <80 MM HG: CPT | Mod: CPTII,S$GLB,, | Performed by: INTERNAL MEDICINE

## 2024-08-27 PROCEDURE — 99204 OFFICE O/P NEW MOD 45 MIN: CPT | Mod: S$GLB,,, | Performed by: INTERNAL MEDICINE

## 2024-08-27 PROCEDURE — 99999 PR PBB SHADOW E&M-EST. PATIENT-LVL III: CPT | Mod: PBBFAC,,, | Performed by: INTERNAL MEDICINE

## 2024-08-27 RX ORDER — CETIRIZINE HYDROCHLORIDE 5 MG/1
5 TABLET ORAL DAILY
COMMUNITY

## 2024-08-28 NOTE — PROGRESS NOTES
History of present illness:  53-year-old female has a history of fatigue and aching since she was 20 years old.  It is been worse for the past 2 years.  She does not remember anything happening 2 years ago that may have made the pain worse.  The pain is bad in the morning.  She has 1 hour morning stiffness.  The pain does not get worse with activity but does interfere with activity.  It does wake her up at night.  She has had some leg cramps as well.  She has occasional swelling in the knee.  She has had no erythema but some increased warmth accompanying the swelling.  She has had previous back surgery.      Tylenol and Advil have not helped.  She was placed on meloxicam with some relief.  Zanaflex helped initially but then stopped working.  Heat has been helping.  She tried topical THC but this did not help.  She had been on physical therapy for 2 years after the back surgery.  This did help.  She has not had therapy for the past year.      She has had no unexplained fevers.  She complains of frontal headache.  She has had no rash or conjunctivitis.  She has lip ulcers but no palatal ulcers.  She has dry eye but not dry mouth.  She has no Raynaud's phenomena, pleurisy, vaginal discharge or ulcers, chronic or bloody diarrhea.  She has paresthesias in the hands.  She has had weakness and numbness in the left leg ever since her back surgery.  She has no thrombophlebitis.  She is a  3 para 2 with a 2nd trimester miscarriage.  She did not know her birth mother but was informed that she had some autoimmune disease and  at a young age.    Systems review:   General: Weight has increased 20 lb   GI: Suffers from IBS.  No liver problems.    :  No kidney or bladder problems     Physical examination:   Skin: No rashes   ENT:  Adequate tears in saliva.  No conjunctivitis or oral ulcers.    Chest: Clear to auscultation   Cardiac:  No murmurs, gallops, rubs   Abdomen:  No organomegaly or masses.  No tenderness to  palpation   Extremities:  No pedal edema   Musculoskeletal: Cervical spine has good range of motion without pain on range of motion.    Shoulders, elbows, wrists have good range of motion.  She has no synovitis.  She has scattered tender areas along the shoulder girdle.    She has early bony hypertrophy of the PIP.  She has no synovitis in the hands.    She has tenderness of the lower lumbar area.  She has good range of motion.    She has tenderness of the left lateral thigh.  She has full range of motion of the hips.    Knees, ankles, small joints the feet are unremarkable.  Laboratory: She has a positive MELINDA, 1:160, homogeneous pattern with negative MELINDA profile since 2017.  Other laboratory studies are unremarkable.  Radiology: X-ray of the knee, hip, and neck show minimal osteoarthritic changes    Assessment:  1.  She has a chronic pain syndrome compatible with fibromyalgia   2. She appears to be developing early osteoarthritis   3. She has a positive MELINDA with negative MELINDA profile.  Clinically she has no evidence to suggest an underlying connective tissue disease.  The positive MELINDA may be related to her mother's history of autoimmune disease     Plans:  1.  Increase tizanidine 8 mg at bedtime   2.  Continue meloxicam as before  3.  Return in 6 months    Answers submitted by the patient for this visit:  Rheumatology Questionnaire (Submitted on 8/24/2024)  fever: No  eye redness: No  mouth sores: Yes  headaches: No  shortness of breath: No  chest pain: No  trouble swallowing: No  diarrhea: No  constipation: No  unexpected weight change: No  genital sore: No  dysuria: No  During the last 3 days, have you had a skin rash?: No  Bruises or bleeds easily: No  cough: No

## 2024-09-13 ENCOUNTER — OFFICE VISIT (OUTPATIENT)
Dept: URGENT CARE | Facility: CLINIC | Age: 54
End: 2024-09-13
Payer: COMMERCIAL

## 2024-09-13 VITALS
HEIGHT: 59 IN | RESPIRATION RATE: 20 BRPM | DIASTOLIC BLOOD PRESSURE: 68 MMHG | OXYGEN SATURATION: 95 % | BODY MASS INDEX: 26.71 KG/M2 | TEMPERATURE: 98 F | SYSTOLIC BLOOD PRESSURE: 111 MMHG | HEART RATE: 65 BPM | WEIGHT: 132.5 LBS

## 2024-09-13 DIAGNOSIS — H65.111 ACUTE ALLERGIC SEROUS OTITIS MEDIA OF RIGHT EAR: ICD-10-CM

## 2024-09-13 DIAGNOSIS — J34.89 NASAL CONGESTION WITH RHINORRHEA: ICD-10-CM

## 2024-09-13 DIAGNOSIS — R09.81 NASAL CONGESTION WITH RHINORRHEA: ICD-10-CM

## 2024-09-13 DIAGNOSIS — S00.31XA ABRASION OF NOSE, INITIAL ENCOUNTER: ICD-10-CM

## 2024-09-13 DIAGNOSIS — J06.9 VIRAL URI WITH COUGH: Primary | ICD-10-CM

## 2024-09-13 DIAGNOSIS — J02.9 SORE THROAT: ICD-10-CM

## 2024-09-13 PROBLEM — K58.9 IBS (IRRITABLE BOWEL SYNDROME): Status: ACTIVE | Noted: 2018-01-26

## 2024-09-13 LAB
CTP QC/QA: YES
CTP QC/QA: YES
MOLECULAR STREP A: NEGATIVE
SARS-COV-2 AG RESP QL IA.RAPID: NEGATIVE

## 2024-09-13 RX ORDER — BROMPHENIRAMINE MALEATE, PSEUDOEPHEDRINE HYDROCHLORIDE, AND DEXTROMETHORPHAN HYDROBROMIDE 2; 30; 10 MG/5ML; MG/5ML; MG/5ML
10 SYRUP ORAL EVERY 4 HOURS PRN
Qty: 118 ML | Refills: 0 | Status: SHIPPED | OUTPATIENT
Start: 2024-09-13 | End: 2024-09-20

## 2024-09-13 RX ORDER — CETIRIZINE HYDROCHLORIDE 10 MG/1
10 TABLET ORAL DAILY PRN
Qty: 30 TABLET | Refills: 0 | Status: SHIPPED | OUTPATIENT
Start: 2024-09-13 | End: 2024-10-13

## 2024-09-13 RX ORDER — VALSARTAN 80 MG/1
80 TABLET ORAL
Qty: 90 TABLET | Refills: 3 | Status: SHIPPED | OUTPATIENT
Start: 2024-09-13

## 2024-09-13 RX ORDER — FLUTICASONE PROPIONATE 50 MCG
2 SPRAY, SUSPENSION (ML) NASAL DAILY PRN
Qty: 15.8 ML | Refills: 0 | Status: SHIPPED | OUTPATIENT
Start: 2024-09-13 | End: 2024-10-13

## 2024-09-13 RX ORDER — MUPIROCIN 20 MG/G
OINTMENT TOPICAL 2 TIMES DAILY
Qty: 30 G | Refills: 0 | Status: SHIPPED | OUTPATIENT
Start: 2024-09-13 | End: 2024-09-23

## 2024-09-13 NOTE — TELEPHONE ENCOUNTER
Refill Decision Note   Kaylee Clement  is requesting a refill authorization.  Brief Assessment and Rationale for Refill:  Approve     Medication Therapy Plan:        Comments:     Note composed:2:11 PM 09/13/2024

## 2024-09-13 NOTE — LETTER
"  September 13, 2024      [facility] - [address]  [place] [address]  Phone: [phone]  Fax: [phone]       Patient: Kaylee Clement   YOB: 1970  Date of Visit: 09/13/2024    To Whom It May Concern:    Terence Clement  was at [facility] on 09/13/2024. The patient may return to work/school on 9/14/24 with no restrictions. If you have any questions or concerns, or if I can be of further assistance, please do not hesitate to contact me.    Sincerely,        Bushra Toussaint PA-C (Jackie)       " none

## 2024-09-13 NOTE — TELEPHONE ENCOUNTER
No care due was identified.  [facility] Embedded Care Due Messages. Reference number: [id number].   9/13/2024 8:07:11 AM CDT

## 2024-09-13 NOTE — PATIENT INSTRUCTIONS
Recommend oral antihistamine (loratadine [Claritin]/cetrizine [Zyrtec]) +/- oral decongestant (pseudoephedrine) for rhinorrhea, steroid nasal spray (flonase), prescription/OTC cough medicine [brompheniramine-pseudoeph-DM (BROMFED DM) ] as needed during day before 8 pm,  Nyquil at night, Tylenol (Acetaminophen) and/or Motrin (Ibuprofen) as directed for control of pain and/or fever.   Bromphed contains pseudoephedrine so please do not take a separate oral decongestant (sudafed/pseudoephedrine) or stimulant (adderall/vyvanse) for ADHD.       Please drink plenty of fluids.  Please get plenty of rest.  Nasal irrigation with a saline spray or Netti Pot like device per their directions is also recommended.  If you  smoke, please stop smoking.    To help ease a sore throat, you can:  Use a sore throat spray.  Suck on hard candy or throat lozenges.  Gargle with warm saltwater a few times each day. Mix of 1/4 teaspoon (1.25 grams) salt in 8 ounces (240 mL) of warm water.  Use a cool mist humidifier to help you breathe easier.    If you negative (-) for a COVID test today and you are continuing to have symptoms, it is recommended to repeat the test in 48 hours x 3. If you continue to be negative, you may return to school/work once you have improved symptoms and no fever for 24 hours without any medications. This applies to all viral illnesses.       Discussed prescriptions and over-the-counter medicines to help with patient's symptoms:  A steroid nose spray (flonase) and antihistamine nasal spray (azelastine) can help with a stuffy nose. It can also help with drainage down the back of your throat.  An antihistamine (loratadine,zyrtec,allegra, xyzal) can help with itching, sneezing, or runny nose.  An antihistamine eye drop can help with itchy eyes.  A decongestant (pseudoephedrine,  Phenylephrine, oxymetazoline aka afrin nasal spray) can help with a stuffy nose. Take <10 days for congestion and rhinorrhea. Once symptoms  improve, proceed with loratadine/zyrtec once a day. These ingredients can keep you up all night, decrease appetite, feel jittery, and raise blood pressure with long term use.  OTC Coricidin can be used for patients with hypertension and palpitations because you cannot use ingredients such as pseudoephedrine and phenylephrine for oral decongestants.    Medications that control cough are suppressants and expectorants. Suppressants are tessalon pearls and dextromethorphan. If you have a productive cough with sputum, you need an expectorant called guaifenesin. Dextromethorphan and Guaifenesin are active ingredients in many OTC cough/cold medications such as Dayquil/Nyquil, Mucinex, and Robitussin Mucus+Chest Congestion.            Common Cold Medicine Ingredients Cheat sheet  Acetaminophen (APAP) -pain reliever/fever reducer  Dextromethorphan - cough suppressant  Guaifenesin - expectorant/thins and loosens mucus  Phenylephrine - nasal decongestant  Diphenhydramine or Doxylamine succinate - antihistamine, helps you fall asleep  Promethazine or Brompheniramine - Prescription strength antihistamines    These OTC cold medications are safe to use if you do not have high blood pressure (hypertension) or palpitations.  DayQuil and NyQuil - Cough, Cold & Flu Relief LiquiCaps  DayQuil: Acetaminophen, Dextromethorphan, and Phenylephrine   NyQuil: Acetaminophen, Dextromethorphan, and Doxylamine  DayQuil and NyQuil SEVERE Maximum Strength Cough, Cold & Flu Relief LiquiCaps  DAYQUIL: Acetaminophen, Dextromethorphan, Guaifenesin, and Phenylephrine  NYQUIL: Acetaminophen, Dextromethorphan, Doxylamine, and Phenylephrine   Mucinex DM: Guaifenesin,Dextromethorphan  Mucinex Maximum Strength Sinus-Max® Pressure, Pain & Cough Liquid Gels: Acetaminophen/Dextromethorphan/ Guaifenesin/Phenylephrine     If not allergic, take Tylenol (Acetaminophen) 650 mg to  1 g every 6 hours as needed  and/or Motrin (Ibuprofen) 600 to 800 mg every 6 hours as  needed for fever or pain.          Please remember that you have received care at an urgent care today. Urgent cares are not emergency rooms and are not equipped to handle life threatening emergencies and cannot rule in or out certain medical conditions and you may be released before all of your medical problems are known or treated.     Please arrange follow up with your primary care physician or speciality clinic within 2-5 days if your signs and symptoms have not resolved or worsen.     Patient can call our Referral Hotline at [phone] to make an appointment.      Please return here or go to the Emergency Department for any concerns or worsening of condition.  Signs of infection. These include a fever of 100.4°F (38°C) or higher, chills, cough, more sputum or change in color of sputum.  You are having so much trouble breathing that you can only say one or two words at a time.  You need to sit upright at all times to be able to breathe and or cannot lie down.  You have trouble breathing when talking or sitting still.  You have a fever of 100.4°F (38°C) or higher or chills.  You have chest pain when you cough, have trouble breathing but can still talk in full sentences, or cough up blood.

## 2024-09-13 NOTE — PROGRESS NOTES
"Subjective:      Patient ID: Kaylee Clement is a 53 y.o. female.    Vitals:  height is 4' 11" (1.499 m) and weight is 60.1 kg (132 lb 7.9 oz). Her oral temperature is 98.4 °F (36.9 °C). Her blood pressure is 111/68 and her pulse is 65. Her respiration is 20 and oxygen saturation is 95%.     Chief Complaint: Sore Throat    Kaylee Clement is a 53 y.o. female who complains of  rt earache, sore throat burning & hoarseness, dry cough with chest discomfort, sx started Tuesday  tx: advil, denies fever.  Patient c/o sores to nose from wiping nose. She has a hx of allergic rhinitis; takes zyrtec prn.     Sore Throat   This is a new problem. The current episode started in the past 7 days. The problem has been gradually worsening. The pain is worse on the right side. There has been no fever. The pain is at a severity of 6/10. The pain is mild. Associated symptoms include congestion, coughing, ear pain, a hoarse voice, a plugged ear sensation and trouble swallowing. Pertinent negatives include no abdominal pain, diarrhea, drooling, ear discharge, headaches, neck pain, shortness of breath, stridor, swollen glands or vomiting. She has had no exposure to strep or mono. She has tried NSAIDs (advil) for the symptoms. The treatment provided no relief.       Constitution: Negative for chills, fatigue and fever.   HENT:  Positive for ear pain, hearing loss, congestion, postnasal drip, sore throat and trouble swallowing. Negative for ear discharge, foreign body in ear, tinnitus, drooling, sinus pain, sinus pressure and voice change.    Neck: Negative for neck pain.   Cardiovascular:  Negative for chest pain, leg swelling, palpitations and sob on exertion.   Eyes:  Negative for eye itching, double vision, blurred vision and eyelid swelling.   Respiratory:  Positive for cough. Negative for sputum production, shortness of breath, stridor, wheezing and asthma.    Gastrointestinal:  Negative for abdominal pain, vomiting and " diarrhea.   Musculoskeletal:  Negative for muscle cramps and muscle ache.   Skin:  Negative for rash.   Allergic/Immunologic: Positive for environmental allergies and seasonal allergies. Negative for asthma and recurrent sinus infections.   Neurological:  Negative for headaches.   Psychiatric/Behavioral:  Negative for nervous/anxious. The patient is not nervous/anxious.       Objective:     Physical Exam   Constitutional: She is oriented to person, place, and time. She is cooperative. No distress.      Comments:Patient is awake and alert, sitting up in exam chair, speaking and answering in complete sentences, in no signs of acute distress     normal  HENT:   Head: Normocephalic and atraumatic.      Comments: Patient observed breathing through her mouth, sniffling, and frequently clearing her throat.    Ears:   Right Ear: External ear and ear canal normal. A middle ear effusion is present.   Left Ear: Tympanic membrane, external ear and ear canal normal.  No middle ear effusion.   Nose: Rhinorrhea and congestion present. No sinus tenderness. Right sinus exhibits no maxillary sinus tenderness and no frontal sinus tenderness. Left sinus exhibits no maxillary sinus tenderness and no frontal sinus tenderness.      Comments: Excoriations to interior nasal ala  Mouth/Throat: Uvula is midline, oropharynx is clear and moist and mucous membranes are normal. Mucous membranes are moist. No oropharyngeal exudate or posterior oropharyngeal erythema. Tonsils are 0 on the right. Tonsils are 0 on the left. Oropharynx is clear.      Comments:  postnasal discharge noted on the posterior pharyngeal wall    Eyes: Conjunctivae are normal. Pupils are equal, round, and reactive to light. Extraocular movement intact   Neck: Neck supple.   Cardiovascular: Normal rate, regular rhythm, normal heart sounds and normal pulses.   Pulmonary/Chest: Effort normal and breath sounds normal. No respiratory distress. She has no wheezes. She has no  rhonchi. She has no rales.   Abdominal: Normal appearance.   Musculoskeletal: Normal range of motion.         General: Normal range of motion.      Cervical back: She exhibits no tenderness.   Lymphadenopathy:     She has no cervical adenopathy.   Neurological: She is alert and oriented to person, place, and time.   Skin: Skin is warm.   Psychiatric: Her behavior is normal. Mood, judgment and thought content normal.   Nursing note and vitals reviewed.      Assessment:     1. Viral URI with cough    2. Sore throat    3. Nasal congestion with rhinorrhea    4. Acute allergic serous otitis media of right ear    5. Abrasion of nose, initial encounter      Patient presents with clinical exam findings and history consistent with above.      On exam, patient is nontoxic appearing and vitals are stable.      Diagnostic testing results were reviewed and discussed with patient/guardian.   Tests ordered in clinic:  Results for orders placed or performed in visit on 09/13/24   POCT Strep A, Molecular   Result Value Ref Range    Molecular Strep A, POC Negative Negative     Acceptable Yes    SARS Coronavirus 2 Antigen, POCT Manual Read   Result Value Ref Range    SARS Coronavirus 2 Antigen Negative Negative     Acceptable Yes        Previous progress notes/admissions/labs and medications were reviewed.      Plan:   Recommend to use lotion infused/soft tissue/kleenex. Moisturize with ointments and/or mupirocin.      Viral URI with cough  -     brompheniramine-pseudoeph-DM (BROMFED DM) 2-30-10 mg/5 mL Syrp; Take 10 mLs by mouth every 4 (four) hours as needed (cough, cold, and congestion).  Dispense: 118 mL; Refill: 0  -     Ambulatory referral/consult to Internal Medicine    Sore throat  -     POCT Strep A, Molecular  -     SARS Coronavirus 2 Antigen, POCT Manual Read  -     fluticasone propionate (FLONASE) 50 mcg/actuation nasal spray; 2 sprays (100 mcg total) by Each Nostril route daily as needed for  "Allergies or Rhinitis.  Dispense: 15.8 mL; Refill: 0    Nasal congestion with rhinorrhea  -     fluticasone propionate (FLONASE) 50 mcg/actuation nasal spray; 2 sprays (100 mcg total) by Each Nostril route daily as needed for Allergies or Rhinitis.  Dispense: 15.8 mL; Refill: 0  -     cetirizine (ZYRTEC) 10 MG tablet; Take 1 tablet (10 mg total) by mouth daily as needed for Rhinitis or Allergies.  Dispense: 30 tablet; Refill: 0  -     brompheniramine-pseudoeph-DM (BROMFED DM) 2-30-10 mg/5 mL Syrp; Take 10 mLs by mouth every 4 (four) hours as needed (cough, cold, and congestion).  Dispense: 118 mL; Refill: 0    Acute allergic serous otitis media of right ear  -     brompheniramine-pseudoeph-DM (BROMFED DM) 2-30-10 mg/5 mL Syrp; Take 10 mLs by mouth every 4 (four) hours as needed (cough, cold, and congestion).  Dispense: 118 mL; Refill: 0    Abrasion of nose, initial encounter  -     mupirocin (BACTROBAN) 2 % ointment; Apply topically 2 (two) times daily. for 10 days  Dispense: 30 g; Refill: 0                  1) See orders for this visit as documented in the electronic medical record.  2) Symptomatic therapy suggested: use acetaminophen/ibuprofen every 6-8 hours prn pain or fever, push fluids.   3) Call or return to clinic prn if these symptoms worsen or fail to improve as anticipated.    Discussed results/diagnosis/plan with patient in clinic.  We had shared decision making for patient's treatment. Patient verbalized understanding and in agreement with current treatment plan.     Patient was instructed to return for re-evaluation with urgent care or PCP for continued outpatient workup and management if symptoms do not improve/worsening symptoms. Strict ED versus clinic precautions given in depth.    Discharge and follow-up instructions given verbally/printed with the patient who expressed understanding. The instructions and results are also available on [facility].              Bushra "Noel Toussaint, " LILI          Patient Instructions   Recommend oral antihistamine (loratadine [Claritin]/cetrizine [Zyrtec]) +/- oral decongestant (pseudoephedrine) for rhinorrhea, steroid nasal spray (flonase), prescription/OTC cough medicine [brompheniramine-pseudoeph-DM (BROMFED DM) ] as needed during day before 8 pm,  Nyquil at night, Tylenol (Acetaminophen) and/or Motrin (Ibuprofen) as directed for control of pain and/or fever.   Bromphed contains pseudoephedrine so please do not take a separate oral decongestant (sudafed/pseudoephedrine) or stimulant (adderall/vyvanse) for ADHD.       Please drink plenty of fluids.  Please get plenty of rest.  Nasal irrigation with a saline spray or Netti Pot like device per their directions is also recommended.  If you  smoke, please stop smoking.    To help ease a sore throat, you can:  Use a sore throat spray.  Suck on hard candy or throat lozenges.  Gargle with warm saltwater a few times each day. Mix of 1/4 teaspoon (1.25 grams) salt in 8 ounces (240 mL) of warm water.  Use a cool mist humidifier to help you breathe easier.    If you negative (-) for a COVID test today and you are continuing to have symptoms, it is recommended to repeat the test in 48 hours x 3. If you continue to be negative, you may return to school/work once you have improved symptoms and no fever for 24 hours without any medications. This applies to all viral illnesses.       Discussed prescriptions and over-the-counter medicines to help with patient's symptoms:  A steroid nose spray (flonase) and antihistamine nasal spray (azelastine) can help with a stuffy nose. It can also help with drainage down the back of your throat.  An antihistamine (loratadine,zyrtec,allegra, xyzal) can help with itching, sneezing, or runny nose.  An antihistamine eye drop can help with itchy eyes.  A decongestant (pseudoephedrine,  Phenylephrine, oxymetazoline aka afrin nasal spray) can help with a stuffy nose. Take <10 days for congestion  and rhinorrhea. Once symptoms improve, proceed with loratadine/zyrtec once a day. These ingredients can keep you up all night, decrease appetite, feel jittery, and raise blood pressure with long term use.  OTC Coricidin can be used for patients with hypertension and palpitations because you cannot use ingredients such as pseudoephedrine and phenylephrine for oral decongestants.    Medications that control cough are suppressants and expectorants. Suppressants are tessalon pearls and dextromethorphan. If you have a productive cough with sputum, you need an expectorant called guaifenesin. Dextromethorphan and Guaifenesin are active ingredients in many OTC cough/cold medications such as Dayquil/Nyquil, Mucinex, and Robitussin Mucus+Chest Congestion.            Common Cold Medicine Ingredients Cheat sheet  Acetaminophen (APAP) -pain reliever/fever reducer  Dextromethorphan - cough suppressant  Guaifenesin - expectorant/thins and loosens mucus  Phenylephrine - nasal decongestant  Diphenhydramine or Doxylamine succinate - antihistamine, helps you fall asleep  Promethazine or Brompheniramine - Prescription strength antihistamines    These OTC cold medications are safe to use if you do not have high blood pressure (hypertension) or palpitations.  DayQuil and NyQuil - Cough, Cold & Flu Relief LiquiCaps  DayQuil: Acetaminophen, Dextromethorphan, and Phenylephrine   NyQuil: Acetaminophen, Dextromethorphan, and Doxylamine  DayQuil and NyQuil SEVERE Maximum Strength Cough, Cold & Flu Relief LiquiCaps  DAYQUIL: Acetaminophen, Dextromethorphan, Guaifenesin, and Phenylephrine  NYQUIL: Acetaminophen, Dextromethorphan, Doxylamine, and Phenylephrine   Mucinex DM: Guaifenesin,Dextromethorphan  Mucinex Maximum Strength Sinus-Max® Pressure, Pain & Cough Liquid Gels: Acetaminophen/Dextromethorphan/ Guaifenesin/Phenylephrine     If not allergic, take Tylenol (Acetaminophen) 650 mg to  1 g every 6 hours as needed  and/or Motrin (Ibuprofen)  600 to 800 mg every 6 hours as needed for fever or pain.          Please remember that you have received care at an urgent care today. Urgent cares are not emergency rooms and are not equipped to handle life threatening emergencies and cannot rule in or out certain medical conditions and you may be released before all of your medical problems are known or treated.     Please arrange follow up with your primary care physician or speciality clinic within 2-5 days if your signs and symptoms have not resolved or worsen.     Patient can call our Referral Hotline at [phone] to make an appointment.      Please return here or go to the Emergency Department for any concerns or worsening of condition.  Signs of infection. These include a fever of 100.4°F (38°C) or higher, chills, cough, more sputum or change in color of sputum.  You are having so much trouble breathing that you can only say one or two words at a time.  You need to sit upright at all times to be able to breathe and or cannot lie down.  You have trouble breathing when talking or sitting still.  You have a fever of 100.4°F (38°C) or higher or chills.  You have chest pain when you cough, have trouble breathing but can still talk in full sentences, or cough up blood.

## 2024-09-14 ENCOUNTER — PATIENT MESSAGE (OUTPATIENT)
Dept: RHEUMATOLOGY | Facility: CLINIC | Age: 54
End: 2024-09-14
Payer: COMMERCIAL

## 2024-09-17 RX ORDER — TIZANIDINE 4 MG/1
8 TABLET ORAL NIGHTLY
Qty: 60 TABLET | Refills: 5 | Status: SHIPPED | OUTPATIENT
Start: 2024-09-17

## 2024-09-17 RX ORDER — TIZANIDINE 4 MG/1
8 TABLET ORAL NIGHTLY
Qty: 60 TABLET | Refills: 5 | Status: SHIPPED | OUTPATIENT
Start: 2024-09-17 | End: 2024-09-17 | Stop reason: SDUPTHER

## 2024-09-17 RX ORDER — ATENOLOL 25 MG/1
25 TABLET ORAL
Qty: 90 TABLET | Refills: 3 | Status: SHIPPED | OUTPATIENT
Start: 2024-09-17

## 2024-09-17 RX ORDER — MELOXICAM 15 MG/1
15 TABLET ORAL DAILY
Qty: 30 TABLET | Refills: 6 | Status: SHIPPED | OUTPATIENT
Start: 2024-09-17 | End: 2024-09-17 | Stop reason: SDUPTHER

## 2024-09-17 RX ORDER — MELOXICAM 15 MG/1
15 TABLET ORAL DAILY
Qty: 30 TABLET | Refills: 6 | Status: SHIPPED | OUTPATIENT
Start: 2024-09-17

## 2024-09-17 NOTE — TELEPHONE ENCOUNTER
No care due was identified.  [facility] Embedded Care Due Messages. Reference number: [id number].   9/17/2024 8:07:18 AM CDT

## 2024-09-17 NOTE — TELEPHONE ENCOUNTER
Refill Decision Note   Kaylee Clement  is requesting a refill authorization.  Brief Assessment and Rationale for Refill:  Approve     Medication Therapy Plan:        Comments:     Note composed:11:34 AM 09/17/2024

## 2024-09-17 NOTE — TELEPHONE ENCOUNTER
No care due was identified.  [facility] Embedded Care Due Messages. Reference number: [id number].   9/17/2024 1:11:09 PM CDT

## 2024-09-18 RX ORDER — MELOXICAM 15 MG/1
15 TABLET ORAL DAILY
Qty: 30 TABLET | Refills: 2 | OUTPATIENT
Start: 2024-09-18

## 2024-09-18 NOTE — TELEPHONE ENCOUNTER
Refill Decision Note   Kaylee Clement  is requesting a refill authorization.  Brief Assessment and Rationale for Refill:  Quick Discontinue     Medication Therapy Plan:  discontinued on 9/17/2024 by Jose M Oliveros MD      Comments:     Note composed:10:22 AM 09/18/2024             Appointments     Last Visit   8/14/2024 Jonathan Melo MD   Next Visit   Visit date not found Jonathan Melo MD

## 2024-09-19 RX ORDER — TIZANIDINE 4 MG/1
4 TABLET ORAL NIGHTLY
Qty: 30 TABLET | OUTPATIENT
Start: 2024-09-19

## 2024-09-19 NOTE — TELEPHONE ENCOUNTER
No care due was identified.  [facility] Embedded Care Due Messages. Reference number: [id number].   9/19/2024 3:44:16 PM CDT

## 2024-09-20 ENCOUNTER — PROCEDURE VISIT (OUTPATIENT)
Dept: NEUROLOGY | Facility: CLINIC | Age: 54
End: 2024-09-20
Payer: COMMERCIAL

## 2024-09-20 DIAGNOSIS — R20.2 HAND PARESTHESIA: ICD-10-CM

## 2024-09-20 DIAGNOSIS — M50.30 DEGENERATIVE DISC DISEASE, CERVICAL: ICD-10-CM

## 2024-09-22 NOTE — TELEPHONE ENCOUNTER
No care due was identified.  [facility] Embedded Care Due Messages. Reference number: [id number].   9/22/2024 5:27:07 PM CDT

## 2024-09-23 RX ORDER — VENLAFAXINE 37.5 MG/1
37.5 TABLET ORAL 2 TIMES DAILY
Qty: 180 TABLET | Refills: 3 | Status: SHIPPED | OUTPATIENT
Start: 2024-09-23

## 2024-09-23 NOTE — TELEPHONE ENCOUNTER
Refill Decision Note   Kaylee Clement  is requesting a refill authorization.  Brief Assessment and Rationale for Refill:  Approve     Medication Therapy Plan:         Comments:     Note composed:8:51 AM 09/23/2024

## 2024-09-24 ENCOUNTER — PATIENT MESSAGE (OUTPATIENT)
Dept: INTERNAL MEDICINE | Facility: CLINIC | Age: 54
End: 2024-09-24
Payer: COMMERCIAL

## 2024-09-24 DIAGNOSIS — G56.03 BILATERAL CARPAL TUNNEL SYNDROME: Primary | ICD-10-CM

## 2024-09-26 ENCOUNTER — OFFICE VISIT (OUTPATIENT)
Dept: URGENT CARE | Facility: CLINIC | Age: 54
End: 2024-09-26
Payer: COMMERCIAL

## 2024-09-26 VITALS
OXYGEN SATURATION: 98 % | WEIGHT: 132 LBS | RESPIRATION RATE: 20 BRPM | BODY MASS INDEX: 26.61 KG/M2 | HEART RATE: 78 BPM | DIASTOLIC BLOOD PRESSURE: 72 MMHG | TEMPERATURE: 97 F | SYSTOLIC BLOOD PRESSURE: 107 MMHG | HEIGHT: 59 IN

## 2024-09-26 DIAGNOSIS — J30.9 ALLERGIC RHINITIS WITH POSTNASAL DRIP: ICD-10-CM

## 2024-09-26 DIAGNOSIS — R09.82 ALLERGIC RHINITIS WITH POSTNASAL DRIP: ICD-10-CM

## 2024-09-26 DIAGNOSIS — J20.8 ACUTE VIRAL BRONCHITIS: Primary | ICD-10-CM

## 2024-09-26 DIAGNOSIS — J98.01 COUGH DUE TO BRONCHOSPASM: ICD-10-CM

## 2024-09-26 PROCEDURE — 99214 OFFICE O/P EST MOD 30 MIN: CPT | Mod: S$GLB,,, | Performed by: FAMILY MEDICINE

## 2024-09-26 RX ORDER — PREDNISONE 20 MG/1
40 TABLET ORAL DAILY
Qty: 10 TABLET | Refills: 0 | Status: SHIPPED | OUTPATIENT
Start: 2024-09-26 | End: 2024-10-01

## 2024-09-26 RX ORDER — DOXYCYCLINE HYCLATE 100 MG
100 TABLET ORAL EVERY 12 HOURS
Qty: 14 TABLET | Refills: 0 | Status: SHIPPED | OUTPATIENT
Start: 2024-09-26 | End: 2024-10-03

## 2024-09-26 RX ORDER — ALBUTEROL SULFATE 90 UG/1
2 INHALANT RESPIRATORY (INHALATION) EVERY 6 HOURS PRN
Qty: 18 G | Refills: 0 | Status: SHIPPED | OUTPATIENT
Start: 2024-09-26

## 2024-09-26 NOTE — LETTER
September 26, 2024      [facility] [address]  [place] [address]  Phone: [phone]  Fax: [phone]       Patient: Kaylee Clement   YOB: 1970  Date of Visit: 09/26/2024    To Whom It May Concern:    Terence Clement  was at [facility] on 09/26/2024. The patient may return to work/school on 09/27/2024 with no restrictions. If you have any questions or concerns, or if I can be of further assistance, please do not hesitate to contact me.    Sincerely,    Antione Matute MD

## 2024-09-26 NOTE — PROGRESS NOTES
"Subjective:      Patient ID: Kaylee Clement is a 53 y.o. female.    Vitals:  height is 4' 11" (1.499 m) and weight is 59.9 kg (132 lb). Her oral temperature is 97 °F (36.1 °C). Her blood pressure is 107/72 and her pulse is 78. Her respiration is 20 and oxygen saturation is 98%.     Chief Complaint: Cough and seen 9/17- Cough is worse    Pt states she was seen here on 9/17 she is not any better, cough is more productive with greenish sputum and low grade temp, req ABX    Cough  This is a recurrent problem. The current episode started in the past 7 days. The problem has been gradually worsening. The cough is Productive of sputum. Associated symptoms include nasal congestion.       Respiratory:  Positive for cough.       Objective:     Physical Exam   Constitutional: She is oriented to person, place, and time. She appears well-developed. She is cooperative.  Non-toxic appearance. She does not appear ill. No distress.   HENT:   Head: Normocephalic and atraumatic.   Ears:   Right Ear: Hearing, tympanic membrane, external ear and ear canal normal.   Left Ear: Hearing, tympanic membrane, external ear and ear canal normal.   Nose: Nose normal. No mucosal edema, rhinorrhea or nasal deformity. No epistaxis. Right sinus exhibits no maxillary sinus tenderness and no frontal sinus tenderness. Left sinus exhibits no maxillary sinus tenderness and no frontal sinus tenderness.   Mouth/Throat: Uvula is midline, oropharynx is clear and moist and mucous membranes are normal. No trismus in the jaw. Normal dentition. No uvula swelling. No oropharyngeal exudate, posterior oropharyngeal edema or posterior oropharyngeal erythema.   Eyes: Conjunctivae and lids are normal. No scleral icterus.   Neck: Trachea normal and phonation normal. Neck supple. No edema present. No erythema present. No neck rigidity present.   Cardiovascular: Normal rate, regular rhythm, normal heart sounds and normal pulses.   Pulmonary/Chest: Effort normal and " breath sounds normal. No respiratory distress. She has no decreased breath sounds. She has no rhonchi.         Comments: Expiratory phase interrupted by persistent coughing which improved with exhaling with pursed lips.      Abdominal: Normal appearance.   Musculoskeletal: Normal range of motion.         General: No deformity. Normal range of motion.   Neurological: She is alert and oriented to person, place, and time. She exhibits normal muscle tone. Coordination normal.   Skin: Skin is warm, dry, intact, not diaphoretic and not pale.   Psychiatric: Her speech is normal and behavior is normal. Judgment and thought content normal.   Nursing note and vitals reviewed.      Assessment:     1. Acute viral bronchitis    2. Allergic rhinitis with postnasal drip    3. Cough due to bronchospasm        Plan:       Acute viral bronchitis  -     predniSONE (DELTASONE) 20 MG tablet; Take 2 tablets (40 mg total) by mouth once daily. for 5 days  Dispense: 10 tablet; Refill: 0  -     doxycycline (VIBRA-TABS) 100 MG tablet; Take 1 tablet (100 mg total) by mouth every 12 (twelve) hours. for 7 days  Dispense: 14 tablet; Refill: 0    Allergic rhinitis with postnasal drip  -     albuterol (VENTOLIN HFA) 90 mcg/actuation inhaler; Inhale 2 puffs into the lungs every 6 (six) hours as needed for Wheezing. Rescue  Dispense: 18 g; Refill: 0  -     inhalation spacing device; Use as directed for inhalation.  Dispense: 1 each; Refill: 0    Cough due to bronchospasm  -     albuterol (VENTOLIN HFA) 90 mcg/actuation inhaler; Inhale 2 puffs into the lungs every 6 (six) hours as needed for Wheezing. Rescue  Dispense: 18 g; Refill: 0  -     inhalation spacing device; Use as directed for inhalation.  Dispense: 1 each; Refill: 0      Thank you for choosing [facility]!     Our goal in the Urgent Care is to always provide outstanding medical care. You may receive a survey by mail or e-mail in the next week regarding your experience today. We  would greatly appreciate you completing and returning the survey. Your feedback provides us with a way to recognize our staff who provide very good care, and it helps us learn how to improve when your experience was below our aspiration of excellence.       We appreciate you trusting us with your medical care. We hope you feel better soon. We will be happy to take care of you for all of your future medical needs.  You must understand that you've received an Urgent Care treatment only and that you may be released before all your medical problems are known or treated. You, the patient, will arrange for follow up care as instructed.  Follow up with your PCP or specialty clinic as directed in the next 1-2 weeks if not improved or as needed.  You can call [phone] to schedule an appointment with the appropriate provider.  Another option is to follow up with [facility] ([email or web address]) virtually for quick simple medical advice.  If your condition worsens we recommend that you receive another evaluation at the emergency room immediately or contact your primary medical clinics after hours call service to discuss your concerns.  Please return here or go to the Emergency Department for any concerns or worsening of condition.      *If you were prescribed a narcotic or controlled medication, do not drive or operate heavy equipment or machinery while taking these medications.

## 2024-09-27 DIAGNOSIS — R52 PAIN: Primary | ICD-10-CM

## 2024-10-15 ENCOUNTER — HOSPITAL ENCOUNTER (OUTPATIENT)
Dept: RADIOLOGY | Facility: OTHER | Age: 54
Discharge: HOME | End: 2024-10-15
Attending: ORTHOPAEDIC SURGERY
Payer: COMMERCIAL

## 2024-10-15 ENCOUNTER — PATIENT MESSAGE (OUTPATIENT)
Dept: ORTHOPEDICS | Facility: CLINIC | Age: 54
End: 2024-10-15

## 2024-10-15 ENCOUNTER — OFFICE VISIT (OUTPATIENT)
Dept: ORTHOPEDICS | Facility: CLINIC | Age: 54
End: 2024-10-15
Payer: COMMERCIAL

## 2024-10-15 VITALS — BODY MASS INDEX: 26.66 KG/M2 | WEIGHT: 132.25 LBS | HEIGHT: 59 IN

## 2024-10-15 DIAGNOSIS — R52 PAIN: ICD-10-CM

## 2024-10-15 DIAGNOSIS — G56.03 BILATERAL CARPAL TUNNEL SYNDROME: ICD-10-CM

## 2024-10-15 PROCEDURE — 3008F BODY MASS INDEX DOCD: CPT | Mod: CPTII,S$GLB,, | Performed by: ORTHOPAEDIC SURGERY

## 2024-10-15 PROCEDURE — 99999 PR PBB SHADOW E&M-EST. PATIENT-LVL III: CPT | Mod: PBBFAC,,, | Performed by: ORTHOPAEDIC SURGERY

## 2024-10-15 PROCEDURE — 4010F ACE/ARB THERAPY RXD/TAKEN: CPT | Mod: CPTII,S$GLB,, | Performed by: ORTHOPAEDIC SURGERY

## 2024-10-15 PROCEDURE — 99204 OFFICE O/P NEW MOD 45 MIN: CPT | Mod: S$GLB,,, | Performed by: ORTHOPAEDIC SURGERY

## 2024-10-15 PROCEDURE — 1159F MED LIST DOCD IN RCRD: CPT | Mod: CPTII,S$GLB,, | Performed by: ORTHOPAEDIC SURGERY

## 2024-10-15 PROCEDURE — 73130 X-RAY EXAM OF HAND: CPT | Mod: 26,50,, | Performed by: RADIOLOGY

## 2024-10-15 PROCEDURE — 73130 X-RAY EXAM OF HAND: CPT | Mod: TC,50,FY

## 2024-10-15 NOTE — PROGRESS NOTES
[facility]  History & Physical  Orthopedics    SUBJECTIVE:      COVID-19 attestation:  This patient was treated during the COVID- pandemic.  This was discussed with the patient, they are aware of our current policies and procedures, were given the option of delaying their visit and or switching to a virtual visit, delaying their surgery when applicable, and they elect to proceed.    Chief Complaint: bilateral carpal tunnel syndrome    Referring Provider: Jonathan Melo MD     History of Present Illness:  Patient is a 54 y.o. right hand dominant female who presents today with complaints of bilateral carpal tunnel syndrome. Patient states she has been having numbness and tingling in bilateral hands for quite some time. She was recently seen in clinic where EMG was performed. She has tried meloxicam.     The patient is a/an .       Past Medical History:   Diagnosis Date    Anxiety     IBS (irritable bowel syndrome)      Past Surgical History:   Procedure Laterality Date     SECTION, LOW TRANSVERSE       Review of patient's allergies indicates:   Allergen Reactions    Tobramycin Other (See Comments)     Red eye,     Red dye      Social History     Social History Narrative    Not on file     No family history on file.      Current Outpatient Medications:     albuterol (VENTOLIN HFA) 90 mcg/actuation inhaler, Inhale 2 puffs into the lungs every 6 (six) hours as needed for Wheezing. Rescue, Disp: 18 g, Rfl: 0    atenoloL (TENORMIN) 25 MG tablet, TAKE 1 TABLET(25 MG) BY MOUTH EVERY DAY, Disp: 90 tablet, Rfl: 3    inhalation spacing device, Use as directed for inhalation., Disp: 1 each, Rfl: 0    meloxicam (MOBIC) 15 MG tablet, Take 1 tablet (15 mg total) by mouth once daily., Disp: 30 tablet, Rfl: 6    tiZANidine (ZANAFLEX) 4 MG tablet, Take 2 tablets (8 mg total) by mouth every evening., Disp: 60 tablet, Rfl: 5    valsartan (DIOVAN) 80 MG tablet, TAKE 1 TABLET(80 MG) BY  "MOUTH EVERY DAY, Disp: 90 tablet, Rfl: 3    venlafaxine (EFFEXOR) 37.5 MG Tab, Take 1 tablet (37.5 mg total) by mouth 2 (two) times daily., Disp: 180 tablet, Rfl: 3    cetirizine (ZYRTEC) 5 MG tablet, Take 5 mg by mouth once daily., Disp: , Rfl:       Review of Systems:  As per HPI otherwise noncontributory    OBJECTIVE:      Vital Signs (Most Recent):  Vitals:    10/15/24 1427   Weight: 60 kg (132 lb 4.4 oz)   Height: 4' 11" (1.499 m)     Body mass index is 26.72 kg/m².      Physical Exam:  Constitutional: The patient appears well-developed and well-nourished. No distress.   Skin: No lesions appreciated  Head: Normocephalic and atraumatic.   Nose: Nose normal.   Ears: No deformities seen  Eyes: Conjunctivae and EOM are normal.   Neck: No tracheal deviation present.   Cardiovascular: Normal rate and intact distal pulses.    Pulmonary/Chest: Effort normal. No respiratory distress.   Abdominal: There is no guarding.   Neurological: The patient is alert.   Psychiatric: The patient has a normal mood and affect.     Bilateral Hand/Wrist Examination:    Observation/Inspection:  Swelling  none    Deformity  none  Discoloration  none     Scars   none    Atrophy  none    HAND/WRIST EXAMINATION:  Finkelstein's Test   Neg  WHAT Test    Neg  Snuff box tenderness   Neg  Morris's Test    Neg  Hook of Hamate Tenderness  Neg  CMC grind    Neg  Circumduction test   Neg    Neurovascular Exam:  Digits WWP, brisk CR < 3s throughout  NVI motor/LTS to M/R/U nerves, radial pulse 2+  Tinel's Test - Carpal Tunnel  Neg  Tinel's Test - Cubital Tunnel  Neg  Phalen's Test    Neg  Median Nerve Compression Test Positive    ROM hand full, painless    ROM wrist full, painless    ROM elbow full, painless    Abdomen not guarded  Respirations nonlabored  Perfusion intact    Diagnostic Results:     Imaging - I independently viewed the patient's imaging as well as the radiology report.  Xrays of the patient's bilateral hands  demonstrate no evidence of " any acute fractures or dislocations or significant degenerative changes.    EMG - severe bilateral carpal tunnel syndrome    ASSESSMENT/PLAN:      54 y.o. yo female with bilateral carpal tunnel syndrome  Plan: The patient and I had a thorough discussion today.  We discussed the working diagnosis as well as several other potential alternative diagnoses.  Treatment options were discussed, both conservative and surgical.  Conservative treatment options would include things such as activity modifications, workplace modifications, a period of rest, oral vs topical OTC and prescription anti-inflammatory medications, occupational therapy, splinting/bracing, immobilization, corticosteroid injections, and others.  Surgical options were discussed as well.     At this time, the patient would like to proceed with a trial of carpal tunnel release on the left side first. Explained that once her compression gets to this point, there is no guarantee in full resolution of symptoms.    Should the patient's symptoms worsen, persist, or fail to improve they should return for reevaluation and I would be happy to see them back anytime.

## 2024-10-17 ENCOUNTER — PATIENT MESSAGE (OUTPATIENT)
Dept: ORTHOPEDICS | Facility: CLINIC | Age: 54
End: 2024-10-17
Payer: COMMERCIAL

## 2024-10-17 DIAGNOSIS — R52 PAIN: ICD-10-CM

## 2024-10-17 DIAGNOSIS — G56.03 BILATERAL CARPAL TUNNEL SYNDROME: Primary | ICD-10-CM

## 2024-10-22 ENCOUNTER — PATIENT MESSAGE (OUTPATIENT)
Dept: ORTHOPEDICS | Facility: CLINIC | Age: 54
End: 2024-10-22
Payer: COMMERCIAL

## 2024-10-23 NOTE — PROGRESS NOTES
Patient seen and examined      54 y.o. yo female with bilateral carpal tunnel syndrome  Plan: The patient and I had a thorough discussion today.  We discussed the working diagnosis as well as several other potential alternative diagnoses.  Treatment options were discussed, both conservative and surgical.  Conservative treatment options would include things such as activity modifications, workplace modifications, a period of rest, oral vs topical OTC and prescription anti-inflammatory medications, occupational therapy, splinting/bracing, immobilization, corticosteroid injections, and others.  Surgical options were discussed as well.      At this time, the patient would like to proceed with a trial of carpal tunnel release on the left side first. Explained that once her compression gets to this point, there is no guarantee in full resolution of symptoms.

## 2024-10-28 ENCOUNTER — ANESTHESIA EVENT (OUTPATIENT)
Dept: SURGERY | Facility: HOSPITAL | Age: 54
End: 2024-10-28
Payer: COMMERCIAL

## 2024-11-04 ENCOUNTER — TELEPHONE (OUTPATIENT)
Dept: ORTHOPEDICS | Facility: CLINIC | Age: 54
End: 2024-11-04
Payer: COMMERCIAL

## 2024-11-04 NOTE — TELEPHONE ENCOUNTER
Spoke with pt she stated she wanted to know if the sx have been booked I told her yes and they will call with time on thurs and she stated understanding and call ended

## 2024-11-06 RX ORDER — TRAMADOL HYDROCHLORIDE 50 MG/1
50 TABLET ORAL EVERY 6 HOURS PRN
Qty: 10 TABLET | Refills: 0 | Status: SHIPPED | OUTPATIENT
Start: 2024-11-06

## 2024-11-08 ENCOUNTER — HOSPITAL ENCOUNTER (OUTPATIENT)
Facility: HOSPITAL | Age: 54
Discharge: HOME | End: 2024-11-08
Attending: ORTHOPAEDIC SURGERY | Admitting: ORTHOPAEDIC SURGERY
Payer: COMMERCIAL

## 2024-11-08 ENCOUNTER — ANESTHESIA (OUTPATIENT)
Dept: SURGERY | Facility: HOSPITAL | Age: 54
End: 2024-11-08
Payer: COMMERCIAL

## 2024-11-08 VITALS
RESPIRATION RATE: 15 BRPM | DIASTOLIC BLOOD PRESSURE: 56 MMHG | HEIGHT: 59 IN | SYSTOLIC BLOOD PRESSURE: 107 MMHG | WEIGHT: 132 LBS | OXYGEN SATURATION: 97 % | BODY MASS INDEX: 26.61 KG/M2 | HEART RATE: 61 BPM | TEMPERATURE: 98 F

## 2024-11-08 DIAGNOSIS — G56.02 LEFT CARPAL TUNNEL SYNDROME: Primary | ICD-10-CM

## 2024-11-08 LAB
B-HCG UR QL: NEGATIVE
CTP QC/QA: YES

## 2024-11-08 PROCEDURE — 64721 CARPAL TUNNEL SURGERY: CPT | Mod: LT,,, | Performed by: ORTHOPAEDIC SURGERY

## 2024-11-08 PROCEDURE — 63600175 PHARM REV CODE 636 W HCPCS: Performed by: NURSE ANESTHETIST, CERTIFIED REGISTERED

## 2024-11-08 PROCEDURE — 25000003 PHARM REV CODE 250: Performed by: NURSE ANESTHETIST, CERTIFIED REGISTERED

## 2024-11-08 PROCEDURE — 36000706: Performed by: ORTHOPAEDIC SURGERY

## 2024-11-08 PROCEDURE — 99900035 HC TECH TIME PER 15 MIN (STAT)

## 2024-11-08 PROCEDURE — 36000707: Performed by: ORTHOPAEDIC SURGERY

## 2024-11-08 PROCEDURE — 71000033 HC RECOVERY, INTIAL HOUR: Performed by: ORTHOPAEDIC SURGERY

## 2024-11-08 PROCEDURE — 25000003 PHARM REV CODE 250: Performed by: PHYSICIAN ASSISTANT

## 2024-11-08 PROCEDURE — 94761 N-INVAS EAR/PLS OXIMETRY MLT: CPT

## 2024-11-08 PROCEDURE — 37000009 HC ANESTHESIA EA ADD 15 MINS: Performed by: ORTHOPAEDIC SURGERY

## 2024-11-08 PROCEDURE — 63600175 PHARM REV CODE 636 W HCPCS: Mod: JZ,JG | Performed by: ORTHOPAEDIC SURGERY

## 2024-11-08 PROCEDURE — 71000015 HC POSTOP RECOV 1ST HR: Performed by: ORTHOPAEDIC SURGERY

## 2024-11-08 PROCEDURE — 81025 URINE PREGNANCY TEST: CPT | Performed by: ORTHOPAEDIC SURGERY

## 2024-11-08 PROCEDURE — 63600175 PHARM REV CODE 636 W HCPCS: Performed by: STUDENT IN AN ORGANIZED HEALTH CARE EDUCATION/TRAINING PROGRAM

## 2024-11-08 PROCEDURE — 37000008 HC ANESTHESIA 1ST 15 MINUTES: Performed by: ORTHOPAEDIC SURGERY

## 2024-11-08 PROCEDURE — 25000003 PHARM REV CODE 250: Performed by: ORTHOPAEDIC SURGERY

## 2024-11-08 PROCEDURE — 25000003 PHARM REV CODE 250: Performed by: STUDENT IN AN ORGANIZED HEALTH CARE EDUCATION/TRAINING PROGRAM

## 2024-11-08 RX ORDER — ONDANSETRON HYDROCHLORIDE 2 MG/ML
INJECTION, SOLUTION INTRAVENOUS
Status: DISCONTINUED | OUTPATIENT
Start: 2024-11-08 | End: 2024-11-08

## 2024-11-08 RX ORDER — MIDAZOLAM HYDROCHLORIDE 1 MG/ML
INJECTION, SOLUTION INTRAMUSCULAR; INTRAVENOUS
Status: DISCONTINUED | OUTPATIENT
Start: 2024-11-08 | End: 2024-11-08

## 2024-11-08 RX ORDER — PROPOFOL 10 MG/ML
VIAL (ML) INTRAVENOUS CONTINUOUS PRN
Status: DISCONTINUED | OUTPATIENT
Start: 2024-11-08 | End: 2024-11-08

## 2024-11-08 RX ORDER — LIDOCAINE HYDROCHLORIDE 10 MG/ML
INJECTION, SOLUTION EPIDURAL; INFILTRATION; INTRACAUDAL; PERINEURAL
Status: DISCONTINUED | OUTPATIENT
Start: 2024-11-08 | End: 2024-11-08 | Stop reason: HOSPADM

## 2024-11-08 RX ORDER — HALOPERIDOL 5 MG/ML
0.5 INJECTION INTRAMUSCULAR EVERY 10 MIN PRN
Status: DISCONTINUED | OUTPATIENT
Start: 2024-11-08 | End: 2024-11-08 | Stop reason: HOSPADM

## 2024-11-08 RX ORDER — BUPIVACAINE HYDROCHLORIDE 2.5 MG/ML
INJECTION, SOLUTION EPIDURAL; INFILTRATION; INTRACAUDAL
Status: DISCONTINUED | OUTPATIENT
Start: 2024-11-08 | End: 2024-11-08 | Stop reason: HOSPADM

## 2024-11-08 RX ORDER — BACITRACIN ZINC 500 UNIT/G
OINTMENT (GRAM) TOPICAL
Status: DISCONTINUED | OUTPATIENT
Start: 2024-11-08 | End: 2024-11-08 | Stop reason: HOSPADM

## 2024-11-08 RX ORDER — MUPIROCIN 20 MG/G
OINTMENT TOPICAL
Status: DISCONTINUED | OUTPATIENT
Start: 2024-11-08 | End: 2024-11-08 | Stop reason: HOSPADM

## 2024-11-08 RX ORDER — DEXAMETHASONE SODIUM PHOSPHATE 4 MG/ML
INJECTION, SOLUTION INTRA-ARTICULAR; INTRALESIONAL; INTRAMUSCULAR; INTRAVENOUS; SOFT TISSUE
Status: DISCONTINUED | OUTPATIENT
Start: 2024-11-08 | End: 2024-11-08

## 2024-11-08 RX ORDER — ONDANSETRON HYDROCHLORIDE 2 MG/ML
4 INJECTION, SOLUTION INTRAVENOUS DAILY PRN
Status: DISCONTINUED | OUTPATIENT
Start: 2024-11-08 | End: 2024-11-08 | Stop reason: HOSPADM

## 2024-11-08 RX ORDER — FENTANYL CITRATE 50 UG/ML
25 INJECTION, SOLUTION INTRAMUSCULAR; INTRAVENOUS EVERY 5 MIN PRN
Status: DISCONTINUED | OUTPATIENT
Start: 2024-11-08 | End: 2024-11-08 | Stop reason: HOSPADM

## 2024-11-08 RX ORDER — PROPOFOL 10 MG/ML
VIAL (ML) INTRAVENOUS
Status: DISCONTINUED | OUTPATIENT
Start: 2024-11-08 | End: 2024-11-08

## 2024-11-08 RX ORDER — ACETAMINOPHEN 500 MG
1000 TABLET ORAL
Status: COMPLETED | OUTPATIENT
Start: 2024-11-08 | End: 2024-11-08

## 2024-11-08 RX ORDER — DEXMEDETOMIDINE HYDROCHLORIDE 100 UG/ML
INJECTION, SOLUTION INTRAVENOUS
Status: DISCONTINUED | OUTPATIENT
Start: 2024-11-08 | End: 2024-11-08

## 2024-11-08 RX ORDER — OXYCODONE HYDROCHLORIDE 5 MG/1
5 TABLET ORAL
Status: DISCONTINUED | OUTPATIENT
Start: 2024-11-08 | End: 2024-11-08 | Stop reason: HOSPADM

## 2024-11-08 RX ORDER — GLUCAGON 1 MG
1 KIT INJECTION
Status: DISCONTINUED | OUTPATIENT
Start: 2024-11-08 | End: 2024-11-08 | Stop reason: HOSPADM

## 2024-11-08 RX ORDER — CEFAZOLIN 2 G/1
2 INJECTION, POWDER, FOR SOLUTION INTRAMUSCULAR; INTRAVENOUS
Status: DISCONTINUED | OUTPATIENT
Start: 2024-11-08 | End: 2024-11-08 | Stop reason: HOSPADM

## 2024-11-08 RX ADMIN — PROPOFOL 125 MCG/KG/MIN: 10 INJECTION, EMULSION INTRAVENOUS at 08:11

## 2024-11-08 RX ADMIN — MIDAZOLAM HYDROCHLORIDE 2 MG: 2 INJECTION, SOLUTION INTRAMUSCULAR; INTRAVENOUS at 07:11

## 2024-11-08 RX ADMIN — PROPOFOL 30 MG: 10 INJECTION, EMULSION INTRAVENOUS at 08:11

## 2024-11-08 RX ADMIN — DEXTROSE MONOHYDRATE 2 G: 50 INJECTION, SOLUTION INTRAVENOUS at 08:11

## 2024-11-08 RX ADMIN — MUPIROCIN: 20 OINTMENT TOPICAL at 07:11

## 2024-11-08 RX ADMIN — DEXAMETHASONE SODIUM PHOSPHATE 4 MG: 4 INJECTION, SOLUTION INTRAMUSCULAR; INTRAVENOUS at 08:11

## 2024-11-08 RX ADMIN — ACETAMINOPHEN 1000 MG: 500 TABLET ORAL at 07:11

## 2024-11-08 RX ADMIN — HALOPERIDOL LACTATE 0.5 MG: 5 INJECTION, SOLUTION INTRAMUSCULAR at 09:11

## 2024-11-08 RX ADMIN — ONDANSETRON 4 MG: 2 INJECTION INTRAMUSCULAR; INTRAVENOUS at 08:11

## 2024-11-08 RX ADMIN — DEXMEDETOMIDINE 10 MCG: 100 INJECTION, SOLUTION, CONCENTRATE INTRAVENOUS at 08:11

## 2024-11-08 NOTE — PLAN OF CARE
Pre op checklist complete. Pt resting in bed with call light in reach. All questions answered. Pt belongings at bedside and plan for mother to hold. Mother brought to bedside. Pt still needs site eloisa.

## 2024-11-08 NOTE — ANESTHESIA PREPROCEDURE EVALUATION
2024  Pre-operative evaluation for Procedure(s) (LRB):  LEFT RELEASE, CARPAL TUNNEL (Left)    Kaylee Belkys Clement is a 54 y.o. female     Patient Active Problem List   Diagnosis    Anxiety    Fibromyalgia    IBS (irritable bowel syndrome)       Review of patient's allergies indicates:   Allergen Reactions    Tobramycin Other (See Comments)     Red eye,     Red dye        No current facility-administered medications on file prior to encounter.     Current Outpatient Medications on File Prior to Encounter   Medication Sig Dispense Refill    atenoloL (TENORMIN) 25 MG tablet TAKE 1 TABLET(25 MG) BY MOUTH EVERY DAY 90 tablet 3    cetirizine (ZYRTEC) 5 MG tablet Take 5 mg by mouth once daily.      meloxicam (MOBIC) 15 MG tablet Take 1 tablet (15 mg total) by mouth once daily. 30 tablet 6    tiZANidine (ZANAFLEX) 4 MG tablet Take 2 tablets (8 mg total) by mouth every evening. 60 tablet 5    valsartan (DIOVAN) 80 MG tablet TAKE 1 TABLET(80 MG) BY MOUTH EVERY DAY 90 tablet 3    venlafaxine (EFFEXOR) 37.5 MG Tab Take 1 tablet (37.5 mg total) by mouth 2 (two) times daily. 180 tablet 3    albuterol (VENTOLIN HFA) 90 mcg/actuation inhaler Inhale 2 puffs into the lungs every 6 (six) hours as needed for Wheezing. Rescue 18 g 0    inhalation spacing device Use as directed for inhalation. 1 each 0       Past Surgical History:   Procedure Laterality Date     SECTION, LOW TRANSVERSE      LUMBAR LAMINECTOMY  2018       Social History     Socioeconomic History    Marital status:    Tobacco Use    Smoking status: Never    Smokeless tobacco: Never   Substance and Sexual Activity    Alcohol use: Yes     Comment: occ.    Drug use: No     Social Drivers of Health     Financial Resource Strain: Medium Risk (2024)    Overall Financial Resource Strain (CARDIA)     Difficulty of Paying Living Expenses:  "Somewhat hard   Food Insecurity: No Food Insecurity (2024)    Hunger Vital Sign     Worried About Running Out of Food in the Last Year: Never true     Ran Out of Food in the Last Year: Never true   Transportation Needs: No Transportation Needs (2020)    PRAPARE - Transportation     Lack of Transportation (Medical): No     Lack of Transportation (Non-Medical): No   Physical Activity: Inactive (2024)    Exercise Vital Sign     Days of Exercise per Week: 0 days     Minutes of Exercise per Session: 0 min   Stress: Stress Concern Present (2024)    [facility] - Occupational Stress Questionnaire     Feeling of Stress : Very much   Housing Stability: High Risk (2024)    Housing Stability Vital Sign     Unable to Pay for Housing in the Last Year: Yes         CBC: No results for input(s): "WBC", "RBC", "HGB", "HCT", "PLT", "MCV", "MCH", "MCHC" in the last 72 hours.    CMP: No results for input(s): "NA", "K", "CL", "CO2", "BUN", "CREATININE", "GLU", "MG", "PHOS", "CALCIUM", "ALBUMIN", "PROT", "ALKPHOS", "ALT", "AST", "BILITOT" in the last 72 hours.    INR  No results for input(s): "PT", "INR", "PROTIME", "APTT" in the last 72 hours.        Diagnostic Studies:      EKD Echo:  No results found for this or any previous visit.       Pre-op Assessment    I have reviewed the Patient Summary Reports.     I have reviewed the Nursing Notes. I have reviewed the NPO Status.   I have reviewed the Medications.     Review of Systems      Physical Exam  General: Well nourished and Cooperative    Airway:  Mallampati: II   Mouth Opening: Normal  TM Distance: Normal  Tongue: Normal  Neck ROM: Normal ROM    Chest/Lungs:  Clear to auscultation, Normal Respiratory Rate    Heart:  Rate: Normal  Rhythm: Regular Rhythm  Sounds: Normal        Anesthesia Plan  Type of Anesthesia, risks & benefits discussed:    Anesthesia Type: Gen Natural Airway  Intra-op Monitoring Plan: Standard ASA " Monitors  Post Op Pain Control Plan: multimodal analgesia and IV/PO Opioids PRN  Induction:  IV  Airway Plan: Direct and Video, Post-Induction  Informed Consent: Informed consent signed with the Patient and all parties understand the risks and agree with anesthesia plan.  All questions answered.   ASA Score: 2    Ready For Surgery From Anesthesia Perspective.     .

## 2024-11-08 NOTE — OP NOTE
[facility] ([facility])  Surgery Department  Operative Note    SUMMARY     Date of Procedure: 11/8/2024     Procedure: Procedure(s) (LRB):  LEFT RELEASE, CARPAL TUNNEL (Left)     Surgeons and Role:     * Lizzy Oconnell MD - Primary    Assisting Surgeon:     Pre-Operative Diagnosis: Bilateral carpal tunnel syndrome [G56.03]  Pain [R52]    Post-Operative Diagnosis: Post-Op Diagnosis Codes:     * Bilateral carpal tunnel syndrome [G56.03]     * Pain [R52]    Anesthesia: Local MAC    Technical Procedures Used: surgery    Description of the Findings of the Procedure:SPECIMENS: None.   COMPLICATIONS: None.   INDICATIONS FOR PROCEDURE: Ms Clement is a 54-year-old female who had numbness   and tingling into her left hand. She has failed conservative treatment, EMG   was positive for carpal tunnel syndrome. Therefore, operative intervention was   deemed necessary. Risks and benefits were explained to the patient in clinic   since performed in clinic.   PROCEDURE IN DETAIL: After correct site was marked with the patient's   participation in the holding area, the patient was brought to the Operating   Room, placed in supine position, underwent MAC anesthesia. A well-padded   nonsterile tourniquet was placed on the left arm. A time-out was called for   correct site, procedure and patient to be indicated. Under sterile conditions,   an injection of lidocaine 1% was injected into the carpal tunnel space. The arm   was prepped and draped in normal sterile fashion. The incision was marked out   using Godinez cardinal lines. The arm was exsanguinated using Esmarch.   Tourniquet was insufflated to 250 mmHg and that is where it remained for a total   of 10 minutes. The incision was made down to the palmar fascia. The palmar   fascia was sharply incised. The transverse ligament was identified. It was   very thick in nature. It was sharply incised. Median nerve was identified. A   Mosquito hemostat was passed from the  undersurface of the transverse ligament   proximally and distally to free it from the median nerve. Metzenbaum scissors   were utilized to cut the transverse ligament proximally and distally. Once that   was completely released, a Mosquito hemostat was passed again to confirm a   complete release. Once that was performed, the area was irrigated with copious   amounts of normal saline. Nylon closed the skin. Sterile dressing was applied.   Tourniquet was deflated. Brisk capillary refill ensued. The patient was   transported to the Recovery Room in stable condition.   POSTOPERATIVE PLAN FOR THIS PATIENT: She is to keep her dressing clean, dry and   intact. We will see her back in 2 weeks for stitch removal.   Of note:  Pt had severe compression of the median nerve.    Significant Surgical Tasks Conducted by the Assistant(s), if Applicable: retraction    Complications: No    Estimated Blood Loss (EBL): * No values recorded between 11/8/2024  8:19 AM and 11/8/2024  8:34 AM *           Implants: * No implants in log *    Specimens:   Specimen (24h ago, onward)      None                    Condition: Good    Disposition: PACU - hemodynamically stable.    Attestation: I performed the procedure.    Discharge Note    SUMMARY     Admit Date: 11/8/2024    Discharge Date and Time: 11/8/2024  9:47 AM    Hospital Course (synopsis of major diagnoses, care, treatment, and services provided during the course of the hospital stay): surgery     Final Diagnosis: Post-Op Diagnosis Codes:     * Bilateral carpal tunnel syndrome [G56.03]     * Pain [R52]    Disposition: Home or Self Care    Follow Up/Patient Instructions:     Medications:  Reconciled Home Medications:      Medication List        START taking these medications      traMADoL 50 mg tablet  Commonly known as: ULTRAM  Take 1 tablet (50 mg total) by mouth every 6 (six) hours as needed for Pain.            CONTINUE taking these medications      albuterol 90 mcg/actuation  inhaler  Commonly known as: VENTOLIN HFA  Inhale 2 puffs into the lungs every 6 (six) hours as needed for Wheezing. Rescue     atenoloL 25 MG tablet  Commonly known as: TENORMIN  TAKE 1 TABLET(25 MG) BY MOUTH EVERY DAY     cetirizine 5 MG tablet  Commonly known as: ZYRTEC  Take 5 mg by mouth once daily.     inhalation spacing device  Use as directed for inhalation.     meloxicam 15 MG tablet  Commonly known as: MOBIC  Take 1 tablet (15 mg total) by mouth once daily.     tiZANidine 4 MG tablet  Commonly known as: ZANAFLEX  Take 2 tablets (8 mg total) by mouth every evening.     valsartan 80 MG tablet  Commonly known as: DIOVAN  TAKE 1 TABLET(80 MG) BY MOUTH EVERY DAY     venlafaxine 37.5 MG Tab  Commonly known as: EFFEXOR  Take 1 tablet (37.5 mg total) by mouth 2 (two) times daily.            Discharge Procedure Orders   Diet general     Call MD for:  temperature >100.4     Call MD for:  persistent nausea and vomiting     Call MD for:  severe uncontrolled pain     Call MD for:  difficulty breathing, headache or visual disturbances     Call MD for:  redness, tenderness, or signs of infection (pain, swelling, redness, odor or green/yellow discharge around incision site)     Call MD for:  hives     Call MD for:  persistent dizziness or light-headedness     Call MD for:  extreme fatigue     No driving, operating heavy equipment or signing legal documents while taking pain medication     Leave dressing on - Keep it clean, dry, and intact until clinic visit     Non weight bearing

## 2024-11-08 NOTE — PLAN OF CARE
VSS. Pt able to tolerate oral liquids. Pt denies c/o pain. Dressing intact. Mom received home meds per bedside delivery. No distress noted. Pt states she is ready for D/C. D/C instructions reviewed with pt and mom, verbalized understanding.

## 2024-11-08 NOTE — BRIEF OP NOTE
[facility] ([facility])  Brief Operative Note    Surgery Date: 11/8/2024     Surgeons and Role:     * Lizzy Oconnell MD - Primary    Assisting Surgeon: None    Pre-op Diagnosis:  Bilateral carpal tunnel syndrome [G56.03]  Pain [R52]    Post-op Diagnosis:  Post-Op Diagnosis Codes:     * Bilateral carpal tunnel syndrome [G56.03]     * Pain [R52]    Procedure(s) (LRB):  LEFT RELEASE, CARPAL TUNNEL (Left)    Anesthesia: Local MAC    Operative Findings: see op note    Estimated Blood Loss: minimal         Specimens:   Specimen (24h ago, onward)      None              Discharge Note    OUTCOME: Patient tolerated treatment/procedure well without complication and is now ready for discharge.    DISPOSITION: Home or Self Care    FINAL DIAGNOSIS:  Left carpal tunnel syndrome    FOLLOWUP: In clinic    DISCHARGE INSTRUCTIONS:    Discharge Procedure Orders   Diet general     Call MD for:  temperature >100.4     Call MD for:  persistent nausea and vomiting     Call MD for:  severe uncontrolled pain     Call MD for:  difficulty breathing, headache or visual disturbances     Call MD for:  redness, tenderness, or signs of infection (pain, swelling, redness, odor or green/yellow discharge around incision site)     Call MD for:  hives     Call MD for:  persistent dizziness or light-headedness     Call MD for:  extreme fatigue     No driving, operating heavy equipment or signing legal documents while taking pain medication     Leave dressing on - Keep it clean, dry, and intact until clinic visit     Non weight bearing

## 2024-11-08 NOTE — TRANSFER OF CARE
"Anesthesia Transfer of Care Note    Patient: Kaylee Clement    Procedure(s) Performed: Procedure(s) (LRB):  LEFT RELEASE, CARPAL TUNNEL (Left)    Patient location: PACU    Anesthesia Type: MAC    Transport from OR: Transported from OR on 6-10 L/min O2 by face mask with adequate spontaneous ventilation    Post pain: adequate analgesia    Post assessment: no apparent anesthetic complications    Post vital signs: stable    Level of consciousness: alert, awake and oriented    Nausea/Vomiting: no nausea/vomiting    Complications: none    Transfer of care protocol was followed      Last vitals: Visit Vitals  /71 (BP Location: Right arm, Patient Position: Lying)   Pulse 60   Temp 36.7 °C (98 °F) (Oral)   Resp 16   Ht 4' 11" (1.499 m)   Wt 59.9 kg (132 lb)   LMP  (Approximate)   SpO2 99%   Breastfeeding No   BMI 26.66 kg/m²     "

## 2024-11-14 ENCOUNTER — OFFICE VISIT (OUTPATIENT)
Dept: URGENT CARE | Facility: CLINIC | Age: 54
End: 2024-11-14
Payer: COMMERCIAL

## 2024-11-14 VITALS
HEIGHT: 59 IN | TEMPERATURE: 99 F | WEIGHT: 132.06 LBS | RESPIRATION RATE: 20 BRPM | DIASTOLIC BLOOD PRESSURE: 97 MMHG | OXYGEN SATURATION: 96 % | HEART RATE: 79 BPM | BODY MASS INDEX: 26.62 KG/M2 | SYSTOLIC BLOOD PRESSURE: 131 MMHG

## 2024-11-17 ENCOUNTER — PATIENT MESSAGE (OUTPATIENT)
Dept: INTERNAL MEDICINE | Facility: CLINIC | Age: 54
End: 2024-11-17
Payer: COMMERCIAL

## 2024-11-18 ENCOUNTER — OFFICE VISIT (OUTPATIENT)
Dept: INTERNAL MEDICINE | Facility: CLINIC | Age: 54
End: 2024-11-18
Payer: COMMERCIAL

## 2024-11-18 VITALS
BODY MASS INDEX: 27.06 KG/M2 | SYSTOLIC BLOOD PRESSURE: 122 MMHG | WEIGHT: 134.25 LBS | HEART RATE: 87 BPM | TEMPERATURE: 98 F | DIASTOLIC BLOOD PRESSURE: 80 MMHG | HEIGHT: 59 IN | OXYGEN SATURATION: 98 %

## 2024-11-18 DIAGNOSIS — J45.909 ACUTE ASTHMATIC BRONCHITIS: Primary | ICD-10-CM

## 2024-11-18 PROCEDURE — 99999 PR PBB SHADOW E&M-EST. PATIENT-LVL III: CPT | Mod: PBBFAC,,, | Performed by: INTERNAL MEDICINE

## 2024-11-18 PROCEDURE — 1159F MED LIST DOCD IN RCRD: CPT | Mod: CPTII,S$GLB,, | Performed by: INTERNAL MEDICINE

## 2024-11-18 PROCEDURE — 1160F RVW MEDS BY RX/DR IN RCRD: CPT | Mod: CPTII,S$GLB,, | Performed by: INTERNAL MEDICINE

## 2024-11-18 PROCEDURE — 99214 OFFICE O/P EST MOD 30 MIN: CPT | Mod: S$GLB,,, | Performed by: INTERNAL MEDICINE

## 2024-11-18 PROCEDURE — 3074F SYST BP LT 130 MM HG: CPT | Mod: CPTII,S$GLB,, | Performed by: INTERNAL MEDICINE

## 2024-11-18 PROCEDURE — 3008F BODY MASS INDEX DOCD: CPT | Mod: CPTII,S$GLB,, | Performed by: INTERNAL MEDICINE

## 2024-11-18 PROCEDURE — 3079F DIAST BP 80-89 MM HG: CPT | Mod: CPTII,S$GLB,, | Performed by: INTERNAL MEDICINE

## 2024-11-18 PROCEDURE — 4010F ACE/ARB THERAPY RXD/TAKEN: CPT | Mod: CPTII,S$GLB,, | Performed by: INTERNAL MEDICINE

## 2024-11-18 RX ORDER — PROMETHAZINE HYDROCHLORIDE AND DEXTROMETHORPHAN HYDROBROMIDE 6.25; 15 MG/5ML; MG/5ML
5 SYRUP ORAL EVERY 4 HOURS PRN
Qty: 240 ML | Refills: 0 | Status: SHIPPED | OUTPATIENT
Start: 2024-11-18

## 2024-11-18 RX ORDER — AMOXICILLIN 875 MG/1
875 TABLET, FILM COATED ORAL 2 TIMES DAILY
Qty: 20 TABLET | Refills: 0 | Status: SHIPPED | OUTPATIENT
Start: 2024-11-18

## 2024-11-18 RX ORDER — AZELASTINE 1 MG/ML
1 SPRAY, METERED NASAL 2 TIMES DAILY
Qty: 30 ML | Refills: 0 | Status: SHIPPED | OUTPATIENT
Start: 2024-11-18

## 2024-11-18 NOTE — PROGRESS NOTES
MEDICAL HISTORY  Hypertension  Lumbar spondylosis  Irritable bowel  Anxiety        MEDICATION  Atenolol 25 mg  Valsartan 80 mg   Effexor XR 37.5 mg b.i.d.  Tizanidine 4 mg q.h.s.  Meloxicam 15 mg daily      54-year-old female   For the past 4 days she has been congested in her chest.  She was been coughing up thick yellow brown mucus.  She was wheezing.  Feels short of breath.    She was aware of a postnasal drip.  It was been taking over-the-counter Zyrtec    She was similar situation in September the took a few weeks to resolve eventually it resolved after a course of prednisone and doxycycline.    She was prescribed albuterol at the time    Examination   Weight 134 lb  BMI 27.12   Pulse 88  Blood pressure 122/78   Tympanic membranes normal   Nasal mucosa slightly congested  Oropharynx no abnormal findings   Lungs coarse rhonchi with forced expiration.  Clear with inspiration   Heart regular rate rhythm    Impression   Acute asthmatic bronchitis    Plan   Amoxicillin 8 7 mg twice a day for 10 days   Promethazine DM  Astelin nasal spray    If that has no significant improvement in his couple of days consider course of prednisone

## 2024-11-22 ENCOUNTER — OFFICE VISIT (OUTPATIENT)
Dept: ORTHOPEDICS | Facility: CLINIC | Age: 54
End: 2024-11-22
Payer: COMMERCIAL

## 2024-11-22 DIAGNOSIS — Z98.890 POST-OPERATIVE STATE: Primary | ICD-10-CM

## 2024-11-22 DIAGNOSIS — G56.03 BILATERAL CARPAL TUNNEL SYNDROME: ICD-10-CM

## 2024-11-22 PROCEDURE — 99999 PR PBB SHADOW E&M-EST. PATIENT-LVL III: CPT | Mod: PBBFAC,,, | Performed by: SPECIALIST/TECHNOLOGIST

## 2024-11-22 NOTE — H&P (VIEW-ONLY)
Ms. Clement is here today for a post-operative visit.  She is 14 days status post Left Carpal Tunnel Release by Dr. Oconnell on 11/8/24. She reports that she is no pain.  She denies fever, chills, and sweats since the time of the surgery.     She notes that her right sided symptoms have increased.  She notes she has continued numbness and tingling within the median nerve distribution.  She is interested in having the right carpal tunnel release done now.    Physical exam:    Vitals:    11/22/24 1413   PainSc: 0-No pain     Vital signs are stable, patient is afebrile.  Patient is well dressed and well groomed, no acute distress.  Alert and oriented to person, place, and time.  Post op dressing taken down.  Incision is clean, dry and intact.  There is no erythema or exudate.  There is no sign of any infection. She is NVI. Sutures removed without difficulty.  Able to make a composite fist     Right wrist examination   Negative Durkan's and Phalen's.  Negative Tinel at the carpal tunnel     EMG study reveals severe bilateral carpal tunnel    Assessment:  s/p Left Carpal Tunnel Release        Plan:  Kaylee was seen today for post-op evaluation.    Diagnoses and all orders for this visit:    Post-operative state    Bilateral carpal tunnel syndrome        - PO instruction reviewed and provided to patient  - Educated patient on HEP ROM of the Elbow, Wrist and Hand for 15 minutes.  - Educated on scar massage  - Recommend nighttime bracing with Carpal Tunnel Brace  - Discussed the nature of right carpal tunnel surgery.  Reviewed postoperative care.  Inform patient no lifting pushing or pulling 2 weeks postoperatively.  Patient is to keep the dressing on for 2 weeks' post operatively.  If dressing gets dirty, wet, or irritated, patient will f/u in clinic for a dressing change. She will follow up 2 weeks postoperatively for suture removal.  We have discussed risks of hand surgery which include but are not limited to  blood  clots in the legs that can travel to the lungs (pulmonary embolism). Pulmonary embolism can cause shortness of breath, chest pain, and even shock. Other risks include urinary tract infection, nausea and vomiting (usually related to pain medication), chronic pain, nerve damage, blood vessel injury, and infection of the hand which can require re-operation. Furthermore, the risks of anesthesia include potential heart, lung, kidney, and liver damage.  Patient  understands and is ready for the procedure.   Offered prior appointment with conducting surgeon. Patient denied and would like to proceed with surgery as planned.   Consents obtained in clinic.     - ABLE to work (pre-injury work level) from the L Carpal Tunnel Release      Mau Barbour PA-C, ATC  [facility]  [facility] [place], [place]    Disclaimer: This note has been generated using voice-recognition software. There may be typographical errors that have been missed during proof-reading.

## 2024-11-26 DIAGNOSIS — G56.03 BILATERAL CARPAL TUNNEL SYNDROME: Primary | ICD-10-CM

## 2024-11-26 DIAGNOSIS — R52 PAIN: ICD-10-CM

## 2024-12-02 ENCOUNTER — ANESTHESIA EVENT (OUTPATIENT)
Dept: SURGERY | Facility: HOSPITAL | Age: 54
End: 2024-12-02
Payer: COMMERCIAL

## 2024-12-03 ENCOUNTER — TELEPHONE (OUTPATIENT)
Dept: ORTHOPEDICS | Facility: CLINIC | Age: 54
End: 2024-12-03
Payer: COMMERCIAL

## 2024-12-03 NOTE — TELEPHONE ENCOUNTER
Offered ----- Message from Adarsh sent at 12/3/2024 11:08 AM CST -----  Type:  Patient Returning Call    Who Called: pt  Who Left Message for Patient: unknown  Does the patient know what this is regarding?:   Would the patient rather a call back or a response via [employer]? call  Best Call Back Number: [phone]  Additional Information:

## 2024-12-05 RX ORDER — TRAMADOL HYDROCHLORIDE 50 MG/1
50 TABLET ORAL EVERY 6 HOURS PRN
Qty: 10 TABLET | Refills: 0 | Status: SHIPPED | OUTPATIENT
Start: 2024-12-05

## 2024-12-06 ENCOUNTER — TELEPHONE (OUTPATIENT)
Dept: ORTHOPEDICS | Facility: CLINIC | Age: 54
End: 2024-12-06
Payer: COMMERCIAL

## 2024-12-09 ENCOUNTER — HOSPITAL ENCOUNTER (OUTPATIENT)
Facility: HOSPITAL | Age: 54
Discharge: HOME | End: 2024-12-09
Attending: ORTHOPAEDIC SURGERY | Admitting: ORTHOPAEDIC SURGERY
Payer: COMMERCIAL

## 2024-12-09 ENCOUNTER — ANESTHESIA (OUTPATIENT)
Dept: SURGERY | Facility: HOSPITAL | Age: 54
End: 2024-12-09
Payer: COMMERCIAL

## 2024-12-09 VITALS
OXYGEN SATURATION: 99 % | DIASTOLIC BLOOD PRESSURE: 52 MMHG | TEMPERATURE: 98 F | HEART RATE: 59 BPM | WEIGHT: 134 LBS | RESPIRATION RATE: 13 BRPM | HEIGHT: 59 IN | SYSTOLIC BLOOD PRESSURE: 96 MMHG | BODY MASS INDEX: 27.01 KG/M2

## 2024-12-09 DIAGNOSIS — G56.01 RIGHT CARPAL TUNNEL SYNDROME: Primary | ICD-10-CM

## 2024-12-09 LAB
B-HCG UR QL: NEGATIVE
CTP QC/QA: YES

## 2024-12-09 PROCEDURE — 25000003 PHARM REV CODE 250: Performed by: NURSE ANESTHETIST, CERTIFIED REGISTERED

## 2024-12-09 PROCEDURE — 25000003 PHARM REV CODE 250: Performed by: STUDENT IN AN ORGANIZED HEALTH CARE EDUCATION/TRAINING PROGRAM

## 2024-12-09 PROCEDURE — 99900035 HC TECH TIME PER 15 MIN (STAT)

## 2024-12-09 PROCEDURE — 63600175 PHARM REV CODE 636 W HCPCS: Performed by: ORTHOPAEDIC SURGERY

## 2024-12-09 PROCEDURE — D9220A PRA ANESTHESIA: Mod: CRNA,,, | Performed by: NURSE ANESTHETIST, CERTIFIED REGISTERED

## 2024-12-09 PROCEDURE — 64721 CARPAL TUNNEL SURGERY: CPT | Mod: 79,RT,, | Performed by: ORTHOPAEDIC SURGERY

## 2024-12-09 PROCEDURE — D9220A PRA ANESTHESIA: Mod: ANES,,, | Performed by: ANESTHESIOLOGY

## 2024-12-09 PROCEDURE — 71000033 HC RECOVERY, INTIAL HOUR: Performed by: ORTHOPAEDIC SURGERY

## 2024-12-09 PROCEDURE — 25000003 PHARM REV CODE 250: Performed by: ORTHOPAEDIC SURGERY

## 2024-12-09 PROCEDURE — 71000015 HC POSTOP RECOV 1ST HR: Performed by: ORTHOPAEDIC SURGERY

## 2024-12-09 PROCEDURE — 63600175 PHARM REV CODE 636 W HCPCS: Performed by: NURSE ANESTHETIST, CERTIFIED REGISTERED

## 2024-12-09 PROCEDURE — 25000003 PHARM REV CODE 250: Performed by: PHYSICIAN ASSISTANT

## 2024-12-09 PROCEDURE — 81025 URINE PREGNANCY TEST: CPT | Performed by: ORTHOPAEDIC SURGERY

## 2024-12-09 PROCEDURE — 36000707: Performed by: ORTHOPAEDIC SURGERY

## 2024-12-09 PROCEDURE — 94761 N-INVAS EAR/PLS OXIMETRY MLT: CPT

## 2024-12-09 PROCEDURE — 37000009 HC ANESTHESIA EA ADD 15 MINS: Performed by: ORTHOPAEDIC SURGERY

## 2024-12-09 PROCEDURE — 36000706: Performed by: ORTHOPAEDIC SURGERY

## 2024-12-09 PROCEDURE — 37000008 HC ANESTHESIA 1ST 15 MINUTES: Performed by: ORTHOPAEDIC SURGERY

## 2024-12-09 RX ORDER — CETIRIZINE HYDROCHLORIDE 5 MG/1
5 TABLET ORAL DAILY
COMMUNITY

## 2024-12-09 RX ORDER — HALOPERIDOL 5 MG/ML
0.5 INJECTION INTRAMUSCULAR EVERY 10 MIN PRN
Status: DISCONTINUED | OUTPATIENT
Start: 2024-12-09 | End: 2024-12-09 | Stop reason: HOSPADM

## 2024-12-09 RX ORDER — PHENYLEPHRINE HYDROCHLORIDE 10 MG/ML
INJECTION INTRAVENOUS
Status: DISCONTINUED | OUTPATIENT
Start: 2024-12-09 | End: 2024-12-09

## 2024-12-09 RX ORDER — HYDROCODONE BITARTRATE AND ACETAMINOPHEN 5; 325 MG/1; MG/1
1 TABLET ORAL EVERY 4 HOURS PRN
Status: DISCONTINUED | OUTPATIENT
Start: 2024-12-09 | End: 2024-12-09 | Stop reason: HOSPADM

## 2024-12-09 RX ORDER — BACITRACIN ZINC 500 UNIT/G
OINTMENT (GRAM) TOPICAL
Status: DISCONTINUED | OUTPATIENT
Start: 2024-12-09 | End: 2024-12-09 | Stop reason: HOSPADM

## 2024-12-09 RX ORDER — FENTANYL CITRATE 50 UG/ML
25 INJECTION, SOLUTION INTRAMUSCULAR; INTRAVENOUS EVERY 5 MIN PRN
Status: DISCONTINUED | OUTPATIENT
Start: 2024-12-09 | End: 2024-12-09 | Stop reason: HOSPADM

## 2024-12-09 RX ORDER — LIDOCAINE HYDROCHLORIDE 10 MG/ML
INJECTION, SOLUTION EPIDURAL; INFILTRATION; INTRACAUDAL; PERINEURAL
Status: DISCONTINUED | OUTPATIENT
Start: 2024-12-09 | End: 2024-12-09 | Stop reason: HOSPADM

## 2024-12-09 RX ORDER — BUPIVACAINE HYDROCHLORIDE 2.5 MG/ML
INJECTION, SOLUTION EPIDURAL; INFILTRATION; INTRACAUDAL
Status: DISCONTINUED | OUTPATIENT
Start: 2024-12-09 | End: 2024-12-09 | Stop reason: HOSPADM

## 2024-12-09 RX ORDER — DEXMEDETOMIDINE HYDROCHLORIDE 100 UG/ML
INJECTION, SOLUTION INTRAVENOUS
Status: DISCONTINUED | OUTPATIENT
Start: 2024-12-09 | End: 2024-12-09

## 2024-12-09 RX ORDER — GLUCAGON 1 MG
1 KIT INJECTION
Status: DISCONTINUED | OUTPATIENT
Start: 2024-12-09 | End: 2024-12-09 | Stop reason: HOSPADM

## 2024-12-09 RX ORDER — PROPOFOL 10 MG/ML
VIAL (ML) INTRAVENOUS CONTINUOUS PRN
Status: DISCONTINUED | OUTPATIENT
Start: 2024-12-09 | End: 2024-12-09

## 2024-12-09 RX ORDER — METHOCARBAMOL 500 MG/1
1000 TABLET, FILM COATED ORAL ONCE
Status: DISCONTINUED | OUTPATIENT
Start: 2024-12-09 | End: 2024-12-09 | Stop reason: HOSPADM

## 2024-12-09 RX ORDER — ONDANSETRON HYDROCHLORIDE 2 MG/ML
INJECTION, SOLUTION INTRAVENOUS
Status: DISCONTINUED | OUTPATIENT
Start: 2024-12-09 | End: 2024-12-09

## 2024-12-09 RX ORDER — PROPOFOL 10 MG/ML
VIAL (ML) INTRAVENOUS
Status: DISCONTINUED | OUTPATIENT
Start: 2024-12-09 | End: 2024-12-09

## 2024-12-09 RX ORDER — OXYCODONE HYDROCHLORIDE 5 MG/1
5 TABLET ORAL
Status: DISCONTINUED | OUTPATIENT
Start: 2024-12-09 | End: 2024-12-09 | Stop reason: HOSPADM

## 2024-12-09 RX ORDER — MUPIROCIN 20 MG/G
OINTMENT TOPICAL
Status: DISCONTINUED | OUTPATIENT
Start: 2024-12-09 | End: 2024-12-09 | Stop reason: HOSPADM

## 2024-12-09 RX ORDER — DEXAMETHASONE SODIUM PHOSPHATE 4 MG/ML
INJECTION, SOLUTION INTRA-ARTICULAR; INTRALESIONAL; INTRAMUSCULAR; INTRAVENOUS; SOFT TISSUE
Status: DISCONTINUED | OUTPATIENT
Start: 2024-12-09 | End: 2024-12-09

## 2024-12-09 RX ORDER — CEFAZOLIN 2 G/1
2 INJECTION, POWDER, FOR SOLUTION INTRAMUSCULAR; INTRAVENOUS
Status: DISCONTINUED | OUTPATIENT
Start: 2024-12-09 | End: 2024-12-09 | Stop reason: HOSPADM

## 2024-12-09 RX ORDER — SODIUM CHLORIDE 0.9 % (FLUSH) 0.9 %
10 SYRINGE (ML) INJECTION
Status: DISCONTINUED | OUTPATIENT
Start: 2024-12-09 | End: 2024-12-09 | Stop reason: HOSPADM

## 2024-12-09 RX ORDER — MIDAZOLAM HYDROCHLORIDE 1 MG/ML
INJECTION, SOLUTION INTRAMUSCULAR; INTRAVENOUS
Status: DISCONTINUED | OUTPATIENT
Start: 2024-12-09 | End: 2024-12-09

## 2024-12-09 RX ORDER — ACETAMINOPHEN 500 MG
1000 TABLET ORAL
Status: COMPLETED | OUTPATIENT
Start: 2024-12-09 | End: 2024-12-09

## 2024-12-09 RX ORDER — ONDANSETRON HYDROCHLORIDE 2 MG/ML
4 INJECTION, SOLUTION INTRAVENOUS EVERY 12 HOURS PRN
Status: DISCONTINUED | OUTPATIENT
Start: 2024-12-09 | End: 2024-12-09 | Stop reason: HOSPADM

## 2024-12-09 RX ADMIN — DEXMEDETOMIDINE 10 MCG: 100 INJECTION, SOLUTION, CONCENTRATE INTRAVENOUS at 07:12

## 2024-12-09 RX ADMIN — PHENYLEPHRINE HYDROCHLORIDE 100 MCG: 10 INJECTION INTRAVENOUS at 07:12

## 2024-12-09 RX ADMIN — PROPOFOL 125 MCG/KG/MIN: 10 INJECTION, EMULSION INTRAVENOUS at 07:12

## 2024-12-09 RX ADMIN — ACETAMINOPHEN 1000 MG: 500 TABLET ORAL at 05:12

## 2024-12-09 RX ADMIN — DEXAMETHASONE SODIUM PHOSPHATE 4 MG: 4 INJECTION, SOLUTION INTRAMUSCULAR; INTRAVENOUS at 07:12

## 2024-12-09 RX ADMIN — ONDANSETRON 4 MG: 2 INJECTION INTRAMUSCULAR; INTRAVENOUS at 07:12

## 2024-12-09 RX ADMIN — PROPOFOL 30 MG: 10 INJECTION, EMULSION INTRAVENOUS at 07:12

## 2024-12-09 RX ADMIN — MUPIROCIN: 20 OINTMENT TOPICAL at 05:12

## 2024-12-09 RX ADMIN — MIDAZOLAM HYDROCHLORIDE 2 MG: 2 INJECTION, SOLUTION INTRAMUSCULAR; INTRAVENOUS at 07:12

## 2024-12-09 RX ADMIN — DEXTROSE MONOHYDRATE 2 G: 50 INJECTION, SOLUTION INTRAVENOUS at 07:12

## 2024-12-09 NOTE — ANESTHESIA POSTPROCEDURE EVALUATION
Anesthesia Post Evaluation    Patient: Kaylee Clement    Procedure(s) Performed: Procedure(s) (LRB):  RIGHT RELEASE, CARPAL TUNNEL (Right)    Final Anesthesia Type: general      Patient location during evaluation: PACU  Patient participation: Yes- Able to Participate  Level of consciousness: awake and alert and oriented  Pain management: adequate  Airway patency: patent    PONV status at discharge: No PONV  Anesthetic complications: no      Cardiovascular status: blood pressure returned to baseline and hemodynamically stable  Respiratory status: unassisted  Hydration status: euvolemic  Follow-up not needed.              Vitals Value Taken Time   BP 96/52 12/09/24 0802   Temp 36.6 °C (97.9 °F) 12/09/24 0800   Pulse 62 12/09/24 0815   Resp 14 12/09/24 0815   SpO2 99 % 12/09/24 0815   Vitals shown include unfiled device data.      Event Time   Out of Recovery 08:10:00         Pain/Melissa Score: Pain Rating Prior to Med Admin: 0 (12/9/2024  5:52 AM)  Melissa Score: 10 (12/9/2024  7:55 AM)

## 2024-12-09 NOTE — DISCHARGE INSTRUCTIONS
.AFTER HAND SURGERY    DOS:  Keep affected wrist elevated above the level of your heart  Check circulation frequently in fingers by pressing on the nail bed. Nail bed should turn white and then pink when released.  Exercise fingers to promote circulation.  Advance diet as tolerated  Resume home medications today.  Keep dressing clean, dry and intact until your clinic visit      DONT:  No driving for 24 hours or while taking narcotic pain medication      CALL PHYSICIAN FOR:  Obvious bleeding  Excessive swelling  Drainage (pus) from the wound  Pain unrelieved by pain medication.

## 2024-12-09 NOTE — OP NOTE
Hand Surgery Operative Note       Surgery Date: 12/9/2024     Surgeons and Role:     * Lizzy Oconnell MD - Primary    Pre-op Diagnosis:  Right carpal tunnel syndrome     Post-op Diagnosis:  Same    Procedure(s) (LRB):  RIGHT RELEASE, CARPAL TUNNEL (Right)    Anesthesia: Local MAC    Estimated Blood Loss: Minimal           Specimen: None    Complications: None               Condition: Stable      INDICATIONS FOR PROCEDURE: Kaylee Clement is a 54 y.o. female  with numbness and tingling in the median distributions of the bilateral hand. EMG was positive for bilateral sided severe carpal tunnel syndromes. She is status post left Carpal tunnel release. Patient has failed conservative treatment therefore, operative intervention was deemed necessary. Risk and benefits were explained to the patient in clinic and consents were performed in clinic.     PROCEDURE IN DETAIL:  The patient was marked in the preop area with the patient's participation. Patient was rolled back to the Operating Room with anesthesia. After a timeout and MAC was induced, the carpal tunnel was injected with 10cc of local anesthetic. The operative extremity was prepped and draped in a normal sterile manner. Timeout was done prior to start of the procedure. Appropriate antibiotics were given prior to start of the procedure.  A 2 cm incision was marked out over the carpal tunnel. The arm was exsanguinated and a sterile tourniquet was used on the upper arm.      The incision was made in line with radial aspect of the 4th finger over the carpal tunnel. Careful dissection was made down to the palmar fascia. Palmar fascia was identified, sharply incised and retracted out of the way. The transverse carpal ligament was identified and sharply incised until the median nerve was exposed. A mosquito hemostat was then passed on the undersurface of the transverse ligament both proximally and distally to prevent adherence of the nerve to the transverse  ligament. Using Metzenbaum scissors, the transverse ligament was cut both proximally and distally ensuring not to cut past Kapl[place] Cardinal Line or injuring the Deep Palmar Arch. Mosquito hemostat was passed proximally and distally to confirm that it was a complete release. The median nerve was inspected and found to be very compressed, edematous, and discolored indicating chronic compression. The area was irrigated with copious amounts of normal saline and nylon suture was used to close the skin. 10cc of additional long acting local anesthetic was injected in the surgical woun.d Sterile soft dressings were applied. The patient recovered from anesthesia and was rolled to the Recovery Room without any issues or complications.    DISPOSITION: The patient will be discharged home today. We will see them back in two weeks to remove the sutures. To wear splint until clinic visit.        Dr. Oconnell was present and scrubbed for the entirety of the case    MD Gordo Christy/[facility]

## 2024-12-09 NOTE — TRANSFER OF CARE
"Anesthesia Transfer of Care Note    Patient: Kaylee Clement    Procedure(s) Performed: Procedure(s) (LRB):  RIGHT RELEASE, CARPAL TUNNEL (Right)    Patient location: PACU    Anesthesia Type: MAC    Transport from OR: Transported from OR on 6-10 L/min O2 by face mask with adequate spontaneous ventilation    Post pain: adequate analgesia    Post assessment: no apparent anesthetic complications    Post vital signs: stable    Level of consciousness: alert, awake and oriented    Nausea/Vomiting: no nausea/vomiting    Complications: none    Transfer of care protocol was followed      Last vitals: Visit Vitals  /60   Pulse 65   Temp 37 °C (98.6 °F) (Temporal)   Resp 16   Ht 4' 11" (1.499 m)   Wt 60.8 kg (134 lb)   SpO2 100%   Breastfeeding No   BMI 27.06 kg/m²     "

## 2024-12-09 NOTE — BRIEF OP NOTE
.Brief Operative Note     SUMMARY     Surgery Date: 12/9/2024     Surgeons and Role:     * Lizzy Oconnell MD - Primary    Assisting Surgeon: None    Pre-op Diagnosis:  Bilateral carpal tunnel syndrome [G56.03]  Pain [R52]    Post-op Diagnosis:  Bilateral carpal tunnel syndrome [G56.03]  Pain [R52]    Procedure(s) (LRB):  RIGHT RELEASE, CARPAL TUNNEL (Right)    Anesthesia: Local MAC    Description of Procedure:   Right CTR    Findings/Key Components:  Nerve was compressed, edematous, discolored    Estimated Blood Loss: Minimal         Specimens Removed:   Specimen (24h ago, onward)      None            Discharge Note      SUMMARY     Admit Date: 12/9/2024    Attending Physician: Lizzy Oconnell MD     Discharge Physician: Lizzy Oconnell MD    Discharge Date: 12/9/2024     Final Diagnosis: Bilateral carpal tunnel syndrome [G56.03]  Pain [R52]    Hospital Course: Patient was admitted for an outpatient procedure and tolerated the procedure well with no complications.    Disposition: Home or Self Care    Follow Up/Patient Instructions:   Current Discharge Medication List        START taking these medications    Details   traMADoL (ULTRAM) 50 mg tablet Take 1 tablet (50 mg total) by mouth every 6 (six) hours as needed for Pain.  Qty: 10 tablet, Refills: 0    Comments: Quantity prescribed more than 7 day supply? No. Bedside [place]  Associated Diagnoses: Right carpal tunnel syndrome           CONTINUE these medications which have NOT CHANGED    Details   albuterol (VENTOLIN HFA) 90 mcg/actuation inhaler Inhale 2 puffs into the lungs every 6 (six) hours as needed for Wheezing. Rescue  Qty: 18 g, Refills: 0    Associated Diagnoses: Allergic rhinitis with postnasal drip; Cough due to bronchospasm      atenoloL (TENORMIN) 25 MG tablet TAKE 1 TABLET(25 MG) BY MOUTH EVERY DAY  Qty: 90 tablet, Refills: 3    Comments: ZERO refills remain on this prescription. Your patient is requesting advance approval of  refills for this medication to PREVENT ANY MISSED DOSES - .      azelastine (ASTELIN) 137 mcg (0.1 %) nasal spray 1 spray (137 mcg total) by Nasal route 2 (two) times daily.  Qty: 30 mL, Refills: 0      cetirizine (ZYRTEC) 5 MG tablet Take 5 mg by mouth once daily.      meloxicam (MOBIC) 15 MG tablet Take 1 tablet (15 mg total) by mouth once daily.  Qty: 30 tablet, Refills: 6      tiZANidine (ZANAFLEX) 4 MG tablet Take 2 tablets (8 mg total) by mouth every evening.  Qty: 60 tablet, Refills: 5    Comments: ZERO refills remain on this prescription. Your patient is requesting advance approval of refills for this medication to PREVENT ANY MISSED DOSES      valsartan (DIOVAN) 80 MG tablet TAKE 1 TABLET(80 MG) BY MOUTH EVERY DAY  Qty: 90 tablet, Refills: 3    Comments: ZERO refills remain on this prescription. Your patient is requesting advance approval of refills for this medication to PREVENT ANY MISSED DOSES - .      venlafaxine (EFFEXOR) 37.5 MG Tab Take 1 tablet (37.5 mg total) by mouth 2 (two) times daily.  Qty: 180 tablet, Refills: 3      inhalation spacing device Use as directed for inhalation.  Qty: 1 each, Refills: 0    Associated Diagnoses: Allergic rhinitis with postnasal drip; Cough due to bronchospasm           STOP taking these medications       amoxicillin (AMOXIL) 875 MG tablet Comments:   Reason for Stopping:         promethazine-dextromethorphan (PROMETHAZINE-DM) 6.25-15 mg/5 mL Syrp Comments:   Reason for Stopping:               Discharge Procedure Orders (must include Diet, Follow-up, Activity)   Discharge Procedure Orders (must include Diet, Follow-up, Activity)   Diet general     Call MD for:  temperature >100.4     Call MD for:  persistent nausea and vomiting     Call MD for:  severe uncontrolled pain     Call MD for:  difficulty breathing, headache or visual disturbances     Call MD for:  redness, tenderness, or signs of infection (pain, swelling, redness, odor or green/yellow discharge around  incision site)     Call MD for:  hives     Call MD for:  persistent dizziness or light-headedness     Call MD for:  extreme fatigue     Sponge bath only until clinic visit     Keep surgical extremity elevated     Lifting restrictions   Order Comments: No heavy lifting right hand     No driving, operating heavy equipment or signing legal documents while taking pain medication.     Leave dressing on - Keep it clean, dry, and intact until clinic visit

## 2024-12-09 NOTE — ANESTHESIA PREPROCEDURE EVALUATION
12/09/2024  Kaylee Clement is a 54 y.o., female.      Patient Active Problem List   Diagnosis    Anxiety    Fibromyalgia    IBS (irritable bowel syndrome)    Left carpal tunnel syndrome       Pre-op Assessment    I have reviewed the Patient Summary Reports.     I have reviewed the Nursing Notes. I have reviewed the NPO Status.   I have reviewed the Medications.     Review of Systems      Physical Exam  General: Well nourished    Airway:  Mallampati: II   Mouth Opening: Normal  TM Distance: Normal  Tongue: Normal  Neck ROM: Normal ROM        Anesthesia Plan  Type of Anesthesia, risks & benefits discussed:    Anesthesia Type: Gen Natural Airway, MAC  Intra-op Monitoring Plan: Standard ASA Monitors  Post Op Pain Control Plan: multimodal analgesia  Induction:  IV  Informed Consent: Patient consented to blood products? No  ASA Score: 1  Day of Surgery Review of History & Physical: H&P Update referred to the surgeon/provider.    Ready For Surgery From Anesthesia Perspective.     .

## 2024-12-17 RX ORDER — VALSARTAN 80 MG/1
80 TABLET ORAL DAILY
Qty: 90 TABLET | Refills: 2 | Status: SHIPPED | OUTPATIENT
Start: 2024-12-17

## 2024-12-17 NOTE — TELEPHONE ENCOUNTER
No care due was identified.  [facility] Embedded Care Due Messages. Reference number: [id number].   12/17/2024 2:55:38 PM CST

## 2024-12-18 NOTE — TELEPHONE ENCOUNTER
Refill Decision Note   Kaylee Clement  is requesting a refill authorization.  Brief Assessment and Rationale for Refill:  Approve     Medication Therapy Plan:        Comments:     Note composed:9:31 PM 12/17/2024

## 2024-12-23 ENCOUNTER — OFFICE VISIT (OUTPATIENT)
Dept: ORTHOPEDICS | Facility: CLINIC | Age: 54
End: 2024-12-23
Payer: COMMERCIAL

## 2024-12-23 DIAGNOSIS — Z98.890 POST-OPERATIVE STATE: Primary | ICD-10-CM

## 2024-12-23 DIAGNOSIS — G56.00 CARPAL TUNNEL SYNDROME, UNSPECIFIED LATERALITY: ICD-10-CM

## 2024-12-23 PROCEDURE — 99999 PR PBB SHADOW E&M-EST. PATIENT-LVL III: CPT | Mod: PBBFAC,,, | Performed by: SPECIALIST/TECHNOLOGIST

## 2024-12-23 NOTE — PROGRESS NOTES
Ms. Clement is here today for a post-operative visit.  She is 14 days status post Right Carpal Tunnel Release by Dr. Oconnell on 12/9/24. She reports that she is minimal pain.  She does note however her left carpal tunnel release has been developing significant pain that radiates into the palm as well as up the arm.  She states that she has been doing scar massage.   She denies fever, chills, and sweats since the time of the surgery.     Physical exam:    Vitals:    12/23/24 1358   PainSc:   7   PainLoc: Hand     Vital signs are stable, patient is afebrile.  Patient is well dressed and well groomed, no acute distress.  Alert and oriented to person, place, and time.  Post op dressing taken down.  Incision is clean, dry and intact.  There is no erythema or exudate.  There is no sign of any infection. She is NVI. Sutures removed without difficulty.  Able to make a composite fist bilaterally.  Her left carpal tunnel incision has hypertrophic scar    Assessment:  s/p Bilateral Carpal Tunnel Release        Plan:  Kaylee was seen today for post-op evaluation and pain.    Diagnoses and all orders for this visit:    Post-operative state  -     Ambulatory referral/consult to Physical/Occupational Therapy; Future    Carpal tunnel syndrome, unspecified laterality  -     Ambulatory referral/consult to Physical/Occupational Therapy; Future      - PO instruction reviewed and provided to patient  - Educated patient on HEP ROM of the Elbow, Wrist and Hand  - Educated on scar massage.  We will refer to therapy.  - Recommend nighttime bracing with Gel Carpal Tunnel Brace  - Patient to f/u in clinic ELVIS Barbour PA-C, ATC  [facility]  [facility] [place], [place]    Disclaimer: This note has been generated using voice-recognition software. There may be typographical errors that have been missed during proof-reading.

## 2024-12-24 ENCOUNTER — PATIENT MESSAGE (OUTPATIENT)
Dept: ORTHOPEDICS | Facility: CLINIC | Age: 54
End: 2024-12-24
Payer: COMMERCIAL

## 2024-12-24 DIAGNOSIS — Z98.890 POST-OPERATIVE STATE: Primary | ICD-10-CM

## 2024-12-24 DIAGNOSIS — G56.00 CARPAL TUNNEL SYNDROME, UNSPECIFIED LATERALITY: ICD-10-CM

## 2024-12-26 RX ORDER — MUPIROCIN 20 MG/G
OINTMENT TOPICAL 3 TIMES DAILY
Qty: 22 G | Refills: 0 | Status: SHIPPED | OUTPATIENT
Start: 2024-12-26

## 2025-01-08 ENCOUNTER — CLINICAL SUPPORT (OUTPATIENT)
Dept: REHABILITATION | Facility: HOSPITAL | Age: 55
End: 2025-01-08
Payer: COMMERCIAL

## 2025-01-08 DIAGNOSIS — M25.532 LEFT WRIST PAIN: ICD-10-CM

## 2025-01-08 DIAGNOSIS — Z98.890 POST-OPERATIVE STATE: ICD-10-CM

## 2025-01-08 DIAGNOSIS — G56.00 CARPAL TUNNEL SYNDROME, UNSPECIFIED LATERALITY: ICD-10-CM

## 2025-01-08 DIAGNOSIS — M25.531 RIGHT WRIST PAIN: ICD-10-CM

## 2025-01-08 DIAGNOSIS — M62.81 MUSCLE WEAKNESS: Primary | ICD-10-CM

## 2025-01-08 PROCEDURE — 97035 APP MDLTY 1+ULTRASOUND EA 15: CPT

## 2025-01-08 PROCEDURE — 97110 THERAPEUTIC EXERCISES: CPT

## 2025-01-08 PROCEDURE — 97165 OT EVAL LOW COMPLEX 30 MIN: CPT

## 2025-01-08 PROCEDURE — 97140 MANUAL THERAPY 1/> REGIONS: CPT

## 2025-01-08 NOTE — PLAN OF CARE
[facility]  Occupational Therapy Initial Evaluation     Name: Kaylee Rizvi Clement  [facility] Number: [id number]    Therapy Diagnosis:   Encounter Diagnoses   Name Primary?    Post-operative state     Carpal tunnel syndrome, unspecified laterality     Muscle weakness Yes    Right wrist pain     Left wrist pain      Physician: Mau Barbour PA-C    Physician Orders: Eval and Treat  Medical Diagnosis: Z98.890 (ICD-10-CM) - Post-operative state G56.00 (ICD-10-CM) - Carpal tunnel syndrome, unspecified laterality  Surgical Procedure and Date:  status post Right Carpal Tunnel Release by Dr. Oconnell on 12/9/24; L CTR 11/8/2024  Evaluation Date: 1/8/2025  Insurance Authorization Period Expiration: 12/23/2025  Plan of Care Certification Period: 4/4/2025  Date of Return to MD: fitz  Visit # / Visits authorized: 1 / 1  FOTO: 1/3    Precautions:  Standard    Time In: 2:55 pm  Time Out: 3:50 pm   Total Appointment Time (timed & untimed codes): 55 minutes    Subjective     Date of Onset: 12/9/2024    History of Current Condition/Mechanism of Injury: Patient is currently 4w2d  status post Right Carpal Tunnel Release by Dr. Oconnell on 12/9/24. She had the L CTR on 11/8/2024. She still has aching pain in both hands on a regular basis, which she didn't have prior to surgery. She also has constant pain in both elbows.     Involved Side: bilateral  Dominant Side: Right    Surgical Procedure:  status post Right Carpal Tunnel Release by Dr. Oconnell on 12/9/24, L CTR 11/8/2024  Imaging: none     Prior Therapy: n/a    Pain:  Functional Pain Scale Rating 0-10:   5/10 on average L>R  3/10 at best  9/10 at worst  Location: bilateral wrists/hands  Description: Aching  Aggravating Factors: activity  Easing Factors: rest    Occupation:    Working presently: employed - work with 3y/o   Duties: fine motor skills; activities with hands    Functional Limitations/Social History:    Previous functional status includes:  "Independent with all ADLs.     Current Functional Status   Home/Living environment: lives with their family      Limitation of Functional Status as follows:   ADLs/IADLs:     - Feeding: mod I     - Bathing: ind    - Dressing/Grooming: zippering, buttoning, pulling up pants, tying shoelaces     - Home Management: mod I    - Driving: ind      Leisure: cooking and working out     Patient's Goals for Therapy: decrease pain and increase FMC     Past Medical History/Physical Systems Review:   Kaylee Clement  has a past medical history of Anxiety, Asthmatic bronchitis, Hypertension, IBS (irritable bowel syndrome), and Neuropathy.    Kaylee Clement  has a past surgical history that includes  section, low transverse; Lumbar laminectomy (2018); Carpal tunnel release (Left, 2024); and Carpal tunnel release (Right, 2024).    Kaylee has a current medication list which includes the following prescription(s): albuterol, atenolol, azelastine, cetirizine, inhalation spacing device, meloxicam, mupirocin, tizanidine, tramadol, valsartan, and venlafaxine.    Review of patient's allergies indicates:   Allergen Reactions    Tobramycin Other (See Comments)     Red eye,     Red dye         Objective     Observation/Appearance: the incisions are well healed with no SOI, no TTP at the lat/med epicondyles. She did have some open wounds on the thumbs but she said this was from "burning" herself when cooking around the holidays. This was nothing to be concerned with, but could not perform advanced modalities at this time.     Special Tests: n/a    Edema. Measured in centimeters.   2025      Left Right     Wrist Crease 14.7 cm 15 cm     Distal Palmar Crease 18.5 cm 18.5 cm         Elbow and Wrist ROM. Measured in degrees.   2025      Left Right     Elbow Ext/Flex WNL WNL     Supination/Pronation       Wrist Ext/Flex 75/65 60/45     Wrist RD/UD 25/55 20/50       Hand ROM. Measured in " degrees.   1/8/2025 1/8/2025      Left Right     Composite fist WNL full fist WNL full fist        Strength (Dynamometer) and Pinch Strength (Pinch Gauge)  Measured in pounds.   1/8/2025 1/8/2025      Left Right     Rung II 7# 19#     Judge Pinch 4# 5#     3pt Pinch 5# 5#       Sensation: not formally assessed  Static sensation in the hands  - no burning sensation or numbness  - however, she is having aching pain in both hands/elbows      CMS Impairment/Limitation/Restriction for FOTO hand Survey    Therapist reviewed FOTO scores for Kaylee Clement on 1/8/2025.   FOTO documents entered into [employer] - see Media section.    Functional Status Score: 36%         Treatment     Total Treatment time (time-based codes) separate from Evaluation: 32 minutes    Kaylee received the following supervised modalities after being cleared for contradictions for 10 minutes:   -Patient tolerates MHP x 10 min in order to decrease pain and increase soft tissue extensibility in preparation for ROM, concurrent with assessment of deficits.       Direct contact modalities after being cleared for contraindications:   Therapeutic ultrasound to Bilateral CT incisions (6 min each) in order to decrease pain in the affected area, increase soft tissue extensibility, and promote ROM tolerance.     -100% duty cycle, 1.0 W/cm2, 3.3 MHz    Kaylee received the following manual therapy techniques for 10 minutes:   - Gentle scar mob to decrease scar tenderness, adherence, and hypersensitivity.    - scar massage discussed    Therapeutic exercises x 10 min  - HEP review  - TGEs  - wrist ROM   - median n glides       Patient Education and Home Exercises      Education provided:   -role of OT, goals for OT, scheduling/cancellations, insurance limitations with patient.  -Additional Education provided:   - HEP in place  - scar massage     Written Home Exercises Provided: yes.  Exercises were reviewed and Kaylee was able to demonstrate them prior to  the end of the session.    Kaylee demonstrated good  understanding of the education provided.     Pt was advised to perform these exercises free of pain, and to stop performing them if pain occurs.    See EMR under Patient Instructions for exercises provided 1/8/2025.    Assessment     Kaylee Clement is a 54 y.o. female referred to outpatient occupational therapy and presents with a medical diagnosis of Z98.890 (ICD-10-CM) - Post-operative state G56.00 (ICD-10-CM) - Carpal tunnel syndrome, unspecified laterality.      Assessment of Occupational Performance   Performance Deficits    Physical:  Joint Mobility  Joint Stability  Muscle Power/Strength   Strength  Pinch Strength  Gross Motor Coordination  Fine Motor Coordination  Pain    Cognitive:  No Deficits    Psychosocial:    No Deficits     Following medical record review it is determined that pt will benefit from occupational therapy services in order to maximize pain free and/or functional use of bilateral wrists/hands. The following goals were discussed with the patient and patient is in agreement with them as to be addressed in the treatment plan. The patient's rehab potential is Good.     Anticipated barriers to occupational therapy: n/a    Plan of care discussed with patient: Yes  Patient's spiritual, cultural and educational needs considered and patient is agreeable to the plan of care and goals as stated below:     Medical Necessity is demonstrated by the following  Occupational Profile/History  Co-morbidities and personal factors that may impact the plan of care [x] LOW: Brief chart review  [] MODERATE: Expanded chart review   [] HIGH: Extensive chart review    Moderate / High Support Documentation: n/a     Examination  Performance deficits relating to physical, cognitive or psychosocial skills that result in activity limitations and/or participation restrictions  [x] LOW: addressing 1-3 Performance deficits  [] MODERATE: 3-5 Performance  deficits  [] HIGH: 5+ Performance deficits (please support below)    Moderate / High Support Documentation: n/a     Treatment Options [x] LOW: Limited options  [] MODERATE: Several options  [] HIGH: Multiple options      Decision Making/ Complexity Score: low       Goals:   The following goals were discussed with the patient and patient is in agreement with them as to be addressed in the treatment plan.   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  3) Pt will reduce pain to less than 4/10 by 4 weeks.  4) Pt will increase functional  strength by 5# in order to A in opening containers for med management or home management tasks by 4 weeks.     Long Term Goals:  Goals to be met by discharge:  1) Independent with HEP  2) Pt will increase R wrist ADRIAN by 15-20 degrees in order to increase functional use for grasp with home management or work related tasks by d/c.   3) Pt will decrease edema to trace or none to increase functional ROM by d/c.   4) Pt will decrease pain to trace or none while completing light home management tasks or work related tasks by d/c.   5) Patient will verbalize indep with zippering and buttoning with no difficulty by d/c.   5) Patient will be able to improve by 20 pts on the FOTO, demonstrating overall improved functional ability with upper extremity.  (Self-care category)      Plan     Certification Period/Plan of care expiration: 1/8/2025 to 4/4/2025.    Outpatient Occupational Therapy 1-2 times weekly for 8 weeks to include the following interventions: Paraffin, Fluidotherapy, Manual therapy/joint mobilizations, Modalities for pain management, US 3 mhz, Therapeutic exercises/activities., Iontophoresis with 2.0 cc Dexamethasone, Strengthening, Orthotic Fabrication/Fit/Training, Edema Control, Scar Management, Wound Care, Electrical Modalities, Joint Protection, and Energy  Conservation.    Belkys Camara, OT

## 2025-01-08 NOTE — PATIENT INSTRUCTIONS
[facility]  OCCUPATIONAL THERAPY  HOME EXERCISE PROGRAM     Scar massage with cocoa butter  Self massage with golf ball   Heat pack PRN for pain/stiffness                Belkys Camara, OT

## 2025-01-13 ENCOUNTER — PATIENT MESSAGE (OUTPATIENT)
Dept: RHEUMATOLOGY | Facility: CLINIC | Age: 55
End: 2025-01-13
Payer: COMMERCIAL

## 2025-01-13 ENCOUNTER — PATIENT MESSAGE (OUTPATIENT)
Dept: REHABILITATION | Facility: HOSPITAL | Age: 55
End: 2025-01-13
Payer: COMMERCIAL

## 2025-01-13 NOTE — PROGRESS NOTES
"  Occupational Therapy Daily Treatment Note     Name: Kaylee Rizvi [facility] Number: [id number]    Therapy Diagnosis:   Encounter Diagnoses   Name Primary?    Muscle weakness Yes    Right wrist pain     Left wrist pain      Physician: Mau Barbour PA-C    Visit Date: 1/14/2025  Physician Orders: Eval and Treat  Medical Diagnosis: Z98.890 (ICD-10-CM) - Post-operative state G56.00 (ICD-10-CM) - Carpal tunnel syndrome, unspecified laterality  Surgical Procedure and Date:  status post Right Carpal Tunnel Release by Dr. Oconnell on 12/9/24; L CTR 11/8/2024  Evaluation Date: 1/8/2025  Insurance Authorization Period Expiration: 12/23/2025  Plan of Care Certification Period: 4/4/2025  Date of Return to MD: fitz  Visit # / Visits authorized: 2 / 10  FOTO: 1/3     Precautions:  Standard  Time In: 2:00 pm  Time Out: 3:00 pm  Total Billable Time: 60 minutes    Subjective     Pt reports: "I fell going up the stairs so my L hand is a little sore and swollen."  she was compliant with home exercise program given last session.   Response to previous treatment:soreness in the L hand  Functional change: still having a little pain with pinching and toileting     Pain: 4/10  Location: bilateral wrists/hands     Objective   Observation/Appearance: the incisions are well healed with no SOI, no TTP at the lat/med epicondyles. She did have some open wounds on the thumbs but she said this was from "burning" herself when cooking around the holidays. This was nothing to be concerned with, but could not perform advanced modalities at this time.      Special Tests: n/a     Edema. Measured in centimeters.    1/8/2025 1/8/2025         Left Right       Wrist Crease 14.7 cm 15 cm       Distal Palmar Crease 18.5 cm 18.5 cm             Elbow and Wrist ROM. Measured in degrees.    1/8/2025 1/8/2025         Left Right       Elbow Ext/Flex WNL WNL       Supination/Pronation           Wrist Ext/Flex 75/65 60/45       Wrist RD/UD 25/55 20/50        "   Hand ROM. Measured in degrees.    1/8/2025 1/8/2025         Left Right       Composite fist WNL full fist WNL full fist           Strength (Dynamometer) and Pinch Strength (Pinch Gauge)  Measured in pounds.    1/8/2025 1/8/2025         Left Right       Rung II 7# 19#       Judge Pinch 4# 5#       3pt Pinch 5# 5#          Sensation: not formally assessed  Static sensation in the hands  - no burning sensation or numbness  - however, she is having aching pain in both hands/elbows        CMS Impairment/Limitation/Restriction for FOTO hand Survey     Therapist reviewed FOTO scores for Kaylee Clement on 1/8/2025.   FOTO documents entered into [employer] - see Media section.     Functional Status Score: 36%            Treatment      Kaylee received the following supervised modalities after being cleared for contradictions for 10 minutes:   -Patient tolerates paraffin w/ MHP to both hands for 10 min, pre-tx to decrease pain & increase tissue extensibility concurrent with assessment of deficits.      Kaylee received the following direct contact modalities after being cleared for contraindications for 15 minutes:  -Therapeutic ultrasound to Bilateral CT incisions (7 min each) in order to decrease pain in the affected area, increase soft tissue extensibility, and promote ROM tolerance.                -100% duty cycle, 1.0 W/cm2, 3.3 MHz    Kaylee received therapeutic exercises for 20 minutes including:  - TGEs x 15   - wrist ROM 1# x 15  - prayer stretch 3x20'  - flexbar rollout massage x 2 min  - red flexbar smiles and frowns    Kaylee participated in dynamic functional therapeutic activities to improve functional performance for 20  minutes, including:  - finger<>palm translation with coins 1 container each hand   - red CP and small poms 1 round each hand  - isospheres x 2 min each hand    Home Exercises and Education Provided     Education provided:   - HEP in place  - Progress towards goals     Written Home  Exercises Provided: yes.  Exercises were reviewed and Kaylee was able to demonstrate them prior to the end of the session.  Kaylee demonstrated good  understanding of the HEP provided.   .   See EMR under Patient Instructions for exercises provided  at IE .        Assessment     Upon arrival, patient states that she is having some increased pain/swelling in the L palm, which she attributes to a fall on an outstretched L hand over the weekend. The L incision is also a little more TTP than usual. Therapeutic ultrasound to both incisions in order to decrease pain in the affected area, increase soft tissue extensibility, and promote ROM tolerance.  Pt would continue to benefit from skilled OT. Continue skilled OT POC and progress per protocol as tolerated.      Kaylee is progressing well towards her goals and there are no updates to goals at this time. Pt prognosis is Good.     Pt will continue to benefit from skilled outpatient occupational therapy to address the deficits listed in the problem list on initial evaluation provide pt/family education and to maximize pt's level of independence in the home and community environment.     Anticipated barriers to occupational therapy: n/a    Pt's spiritual, cultural and educational needs considered and pt agreeable to plan of care and goals.    Goals:  The following goals were discussed with the patient and patient is in agreement with them as to be addressed in the treatment plan.   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 Not met 1/14/2025, continue progressing   3) Pt will reduce pain to less than 4/10 by 4 weeks. Not met 1/14/2025, continue progressing   4) Pt will increase functional  strength by 5# in order to A in opening containers for med management or home management tasks by 4 weeks. Not met 1/14/2025, continue progressing      Long Term  Goals:  Goals to be met by discharge:  1) Independent with HEP  2) Pt will increase R wrist ADRIAN by 15-20 degrees in order to increase functional use for grasp with home management or work related tasks by d/c.   3) Pt will decrease edema to trace or none to increase functional ROM by d/c.   4) Pt will decrease pain to trace or none while completing light home management tasks or work related tasks by d/c.   5) Patient will verbalize indep with zippering and buttoning with no difficulty by d/c.   5) Patient will be able to improve by 20 pts on the FOTO, demonstrating overall improved functional ability with upper extremity.  (Self-care category)    Plan   Continue skilled OT POC and progress per protocol as tolerated.   Updates/Grading for next session: cont current plan      Belkys Camara, OT

## 2025-01-14 ENCOUNTER — CLINICAL SUPPORT (OUTPATIENT)
Dept: REHABILITATION | Facility: HOSPITAL | Age: 55
End: 2025-01-14
Payer: COMMERCIAL

## 2025-01-14 DIAGNOSIS — M25.531 RIGHT WRIST PAIN: ICD-10-CM

## 2025-01-14 DIAGNOSIS — M62.81 MUSCLE WEAKNESS: Primary | ICD-10-CM

## 2025-01-14 DIAGNOSIS — M25.532 LEFT WRIST PAIN: ICD-10-CM

## 2025-01-14 PROCEDURE — 97530 THERAPEUTIC ACTIVITIES: CPT

## 2025-01-14 PROCEDURE — 97018 PARAFFIN BATH THERAPY: CPT

## 2025-01-14 PROCEDURE — 97035 APP MDLTY 1+ULTRASOUND EA 15: CPT

## 2025-01-14 PROCEDURE — 97110 THERAPEUTIC EXERCISES: CPT

## 2025-01-16 ENCOUNTER — CLINICAL SUPPORT (OUTPATIENT)
Dept: REHABILITATION | Facility: HOSPITAL | Age: 55
End: 2025-01-16
Payer: COMMERCIAL

## 2025-01-16 DIAGNOSIS — M62.81 MUSCLE WEAKNESS: Primary | ICD-10-CM

## 2025-01-16 DIAGNOSIS — M25.532 LEFT WRIST PAIN: ICD-10-CM

## 2025-01-16 DIAGNOSIS — M25.531 RIGHT WRIST PAIN: ICD-10-CM

## 2025-01-16 PROCEDURE — 97035 APP MDLTY 1+ULTRASOUND EA 15: CPT

## 2025-01-16 PROCEDURE — 97018 PARAFFIN BATH THERAPY: CPT

## 2025-01-16 PROCEDURE — 97110 THERAPEUTIC EXERCISES: CPT

## 2025-01-16 PROCEDURE — 97530 THERAPEUTIC ACTIVITIES: CPT

## 2025-01-16 NOTE — PROGRESS NOTES
"  Occupational Therapy Daily Treatment Note     Name: Kaylee Rizvi [facility] Number: [id number]    Therapy Diagnosis:   Encounter Diagnoses   Name Primary?    Muscle weakness Yes    Right wrist pain     Left wrist pain      Physician: Mau Barbour PA-C    Visit Date: 1/16/2025  Physician Orders: Eval and Treat  Medical Diagnosis: Z98.890 (ICD-10-CM) - Post-operative state G56.00 (ICD-10-CM) - Carpal tunnel syndrome, unspecified laterality  Surgical Procedure and Date:  status post Right Carpal Tunnel Release by Dr. Oconnell on 12/9/24; L CTR 11/8/2024  Evaluation Date: 1/8/2025  Insurance Authorization Period Expiration: 12/23/2025  Plan of Care Certification Period: 4/4/2025  Date of Return to MD: fitz  Visit # / Visits authorized: 2 / 10  FOTO: 1/3     Precautions:  Standard  Time In: 2:00 pm  Time Out: 2:55 pm  Total Billable Time: 55 minutes    Subjective     Pt reports: she had a sharp nerve pain in R hand earlier. She cont to report some soreness  she was compliant with home exercise program given last session.   Response to previous treatment:soreness in the L hand  Functional change: still having a little pain with pinching and toileting     Pain: 4/10  Location: bilateral wrists/hands     Objective   Observation/Appearance: the incisions are well healed with no SOI, no TTP at the lat/med epicondyles. She did have some open wounds on the thumbs but she said this was from "burning" herself when cooking around the holidays. This was nothing to be concerned with, but could not perform advanced modalities at this time.      Special Tests: n/a     Edema. Measured in centimeters.    1/8/2025 1/8/2025         Left Right       Wrist Crease 14.7 cm 15 cm       Distal Palmar Crease 18.5 cm 18.5 cm             Elbow and Wrist ROM. Measured in degrees.    1/8/2025 1/8/2025         Left Right       Elbow Ext/Flex WNL WNL       Supination/Pronation           Wrist Ext/Flex 75/65 60/45       Wrist RD/UD 25/55 20/50    "       Hand ROM. Measured in degrees.    1/8/2025 1/8/2025         Left Right       Composite fist WNL full fist WNL full fist           Strength (Dynamometer) and Pinch Strength (Pinch Gauge)  Measured in pounds.    1/8/2025 1/8/2025         Left Right       Rung II 7# 19#       Judge Pinch 4# 5#       3pt Pinch 5# 5#          Sensation: not formally assessed  Static sensation in the hands  - no burning sensation or numbness  - however, she is having aching pain in both hands/elbows        CMS Impairment/Limitation/Restriction for FOTO hand Survey     Therapist reviewed FOTO scores for Kaylee Clement on 1/8/2025.   FOTO documents entered into [employer] - see Media section.     Functional Status Score: 36%            Treatment      Kaylee received the following supervised modalities after being cleared for contradictions for 10 minutes:   -Patient tolerates paraffin w/ MHP to both hands for 10 min, pre-tx to decrease pain & increase tissue extensibility concurrent with assessment of deficits.      Kaylee received the following direct contact modalities after being cleared for contraindications for 10 minutes:  -Therapeutic ultrasound to Bilateral CT incisions (5 min each) in order to decrease pain in the affected area, increase soft tissue extensibility, and promote ROM tolerance.                -100% duty cycle, 1.0 W/cm2, 3.3 MHz    Kaylee received therapeutic exercises for 20 minutes including:  - TGEs x 15   - wrist ROM 1# x 15, 2# on L hand  - prayer stretch 3x20'  - flexbar rollout massage x 2 min (NT)  - red flexbar smiles and frowns    Kaylee participated in dynamic functional therapeutic activities to improve functional performance for 15  minutes, including:  - finger<>palm translation with coins 1 container each hand   - red CP and small poms 1 round each hand  - isospheres x 2 min each hand    Home Exercises and Education Provided     Education provided:   - cont HEP  - Progress towards goals      Written Home Exercises Provided: Patient instructed to cont prior HEP.  Exercises were reviewed and Kaylee was able to demonstrate them prior to the end of the session.  Kaylee demonstrated good  understanding of the HEP provided.   .   See EMR under Patient Instructions for exercises provided  at IE .        Assessment     Pt cont to c/o pain and some swelling in B hands, but feels she has some improvement. She tolerated tx fairly well, and progressed with light strengthening. She reported some increased soreness. Treatment performed as tolerated.  Pt would continue to benefit from skilled OT. Continue skilled OT POC and progress per protocol as tolerated.      Kaylee is progressing fairly well towards her goals and there are no updates to goals at this time. Pt prognosis is Good.     Pt will continue to benefit from skilled outpatient occupational therapy to address the deficits listed in the problem list on initial evaluation provide pt/family education and to maximize pt's level of independence in the home and community environment.     Anticipated barriers to occupational therapy: n/a    Pt's spiritual, cultural and educational needs considered and pt agreeable to plan of care and goals.    Goals:  The following goals were discussed with the patient and patient is in agreement with them as to be addressed in the treatment plan.   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 Not met 1/16/2025, continue progressing   3) Pt will reduce pain to less than 4/10 by 4 weeks. Not met 1/16/2025, continue progressing   4) Pt will increase functional  strength by 5# in order to A in opening containers for med management or home management tasks by 4 weeks. Not met 1/16/2025, continue progressing      Long Term Goals:  Goals to be met by discharge:  1) Independent with HEP  2) Pt will increase R  wrist ADRIAN by 15-20 degrees in order to increase functional use for grasp with home management or work related tasks by d/c.   3) Pt will decrease edema to trace or none to increase functional ROM by d/c.   4) Pt will decrease pain to trace or none while completing light home management tasks or work related tasks by d/c.   5) Patient will verbalize indep with zippering and buttoning with no difficulty by d/c.   5) Patient will be able to improve by 20 pts on the FOTO, demonstrating overall improved functional ability with upper extremity.  (Self-care category)    Plan   Continue skilled OT POC and progress per protocol as tolerated.   Updates/Grading for next session: cont current plan      STEPHANIE Rudolph, ELENAT

## 2025-01-17 ENCOUNTER — PATIENT MESSAGE (OUTPATIENT)
Dept: REHABILITATION | Facility: HOSPITAL | Age: 55
End: 2025-01-17
Payer: COMMERCIAL

## 2025-01-27 NOTE — PROGRESS NOTES
"  Occupational Therapy Daily Treatment Note     Name: Kaylee Rizvi [facility] Number: [id number]    Therapy Diagnosis:   Encounter Diagnoses   Name Primary?    Muscle weakness Yes    Right wrist pain     Left wrist pain        Physician: Mau Barbour PA-C    Visit Date: 1/28/2025  Physician Orders: Eval and Treat  Medical Diagnosis: Z98.890 (ICD-10-CM) - Post-operative state G56.00 (ICD-10-CM) - Carpal tunnel syndrome, unspecified laterality  Surgical Procedure and Date:  status post Right Carpal Tunnel Release by Dr. 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   she was compliant with home exercise program given last session.   Response to previous treatment:soreness in the L hand  Functional change: still having a little pain with pinching and toileting     Pain: 6/10  Location: bilateral wrists/hands     Objective   Observation/Appearance: the incisions are well healed with no SOI, no TTP at the lat/med epicondyles. She did have some open wounds on the thumbs but she said this was from "burning" herself when cooking around the holidays. This was nothing to be concerned with, but could not perform advanced modalities at this time.      Special Tests: n/a     Edema. Measured in centimeters.    1/8/2025 1/8/2025         Left Right       Wrist Crease 14.7 cm 15 cm       Distal Palmar Crease 18.5 cm 18.5 cm             Elbow and Wrist ROM. Measured in degrees.    1/8/2025 1/8/2025         Left Right       Elbow Ext/Flex WNL WNL       Supination/Pronation           Wrist Ext/Flex 75/65 60/45       Wrist " RD/UD 25/55 20/50          Hand ROM. Measured in degrees.    1/8/2025 1/8/2025         Left Right       Composite fist WNL full fist WNL full fist           Strength (Dynamometer) and Pinch Strength (Pinch Gauge)  Measured in pounds.    1/8/2025 1/8/2025         Left Right       Rung II 7# 19#       Judge Pinch 4# 5#       3pt Pinch 5# 5#          Sensation: not formally assessed  Static sensation in the hands  - no burning sensation or numbness  - however, she is having aching pain in both hands/elbows        CMS Impairment/Limitation/Restriction for FOTO hand Survey     Therapist reviewed FOTO scores for Kaylee Belkys Clement on 1/8/2025.   FOTO documents entered into [email or web address] - see Media section.     Functional Status Score: 36%            Treatment      Kaylee received the following supervised modalities after being cleared for contradictions for 10 minutes:   -Patient tolerates paraffin w/ MHP to both hands for 10 min, pre-tx to decrease pain & increase tissue extensibility concurrent with assessment of deficits.      Manual therapy techniques:  x 15  .Gentle IASTM to bilateral CMCJs in order to increase soft tissue extensibility, decrease pain, and tenderness/hypersensitivity in the stated area, and promote scar tissue remodeling.    - Ktape to R thumb with ulnar/radial wrist support  - Ktape to L thumb with radial wrist support    Kaylee received therapeutic exercises for 25 minutes including:  - Therapist A gentle ROM of the thumbs  - webspace stretch  - TGEs x 15   - wrist ROM 0# 2/10 each   - prayer stretch 3x20'  - red flexbar smiles and frowns  - dart throwers 2/10  - thumb ROM     Kaylee participated in dynamic functional therapeutic activities to improve functional performance for 15  minutes, including:  - proprioception flexbar shakes 1 min each way   - wrist true balance x 2 min each   - CMC isometrics with tennis ball    Home Exercises and Education Provided     Education provided:   - cont  HEP  - Progress towards goals     Written Home Exercises Provided: Patient instructed to cont prior HEP.  Exercises were reviewed and Kaylee was able to demonstrate them prior to the end of the session.  Kaylee demonstrated good  understanding of the HEP provided.   .   See EMR under Patient Instructions for exercises provided  at IE .        Assessment   Patient c/o pain and swelling in both hands, which seems to be worsening. Pain is localized to bilateral CMCJs and along the L radial wrist and R ulnar wrist. 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 We did multiple activities to address both of these issues including ktape, CMC isometrics and DQ specfic therex with proprioception.  She says that she has had pain like this for a while and has tested + for RA factor, but this hasn't been confirmed.She has an appt in April with the rheumatologist. Took session easy with little to no resistance to avoid aggravating the previously stated area. Pt would continue to benefit from skilled OT. Continue skilled OT POC and progress per protocol as tolerated.      Kaylee is progressing fairly well towards her goals and there are no updates to goals at this time. Pt prognosis is Good.     Pt will continue to benefit from skilled outpatient occupational therapy to address the deficits listed in the problem list on initial evaluation provide pt/family education and to maximize pt's level of independence in the home and community environment.     Anticipated barriers to occupational therapy: n/a    Pt's spiritual, cultural and educational needs considered and pt agreeable to plan of care and goals.    Goals:  The following goals were discussed with the patient and patient is in agreement with them as to be addressed in the treatment plan.   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 Not met 1/28/2025, continue progressing   3) Pt will reduce pain to less than 4/10 by 4 weeks. Not met 1/28/2025, continue progressing   4) Pt will increase functional  strength by 5# in order to A in opening containers for med management or home management tasks by 4 weeks. Not met 1/28/2025, continue progressing      Long Term Goals:  Goals to be met by discharge:  1) Independent with HEPNot met 1/28/2025, continue progressing   2) Pt will increase R wrist ADRIAN by 15-20 degrees in order to increase functional use for grasp with home management or work related tasks by d/c.  Not met 1/28/2025, continue progressing   3) Pt will decrease edema to trace or none to increase functional ROM by d/c. Not met 1/28/2025, continue progressing   4) Pt will decrease pain to trace or none while completing light home management tasks or work related tasks by d/c. Not met 1/28/2025, continue progressing   5) Patient will verbalize indep with zippering and buttoning with no difficulty by d/c. Not met 1/28/2025, continue progressing   5) Patient will be able to improve by 20 pts on the FOTO, demonstrating overall improved functional ability with upper extremity.  (Self-care category) Not met 1/28/2025, continue progressing     Plan   Continue skilled OT POC and progress per protocol as tolerated.   Updates/Grading for next session: cont current plan    Belkys Camara, OT

## 2025-01-28 ENCOUNTER — CLINICAL SUPPORT (OUTPATIENT)
Dept: REHABILITATION | Facility: HOSPITAL | Age: 55
End: 2025-01-28
Payer: COMMERCIAL

## 2025-01-28 DIAGNOSIS — M25.532 LEFT WRIST PAIN: ICD-10-CM

## 2025-01-28 DIAGNOSIS — M25.531 RIGHT WRIST PAIN: ICD-10-CM

## 2025-01-28 DIAGNOSIS — M62.81 MUSCLE WEAKNESS: Primary | ICD-10-CM

## 2025-01-28 PROCEDURE — 97018 PARAFFIN BATH THERAPY: CPT

## 2025-01-28 PROCEDURE — 97110 THERAPEUTIC EXERCISES: CPT

## 2025-01-28 PROCEDURE — 97530 THERAPEUTIC ACTIVITIES: CPT

## 2025-01-28 PROCEDURE — 97140 MANUAL THERAPY 1/> REGIONS: CPT

## 2025-01-30 ENCOUNTER — CLINICAL SUPPORT (OUTPATIENT)
Dept: REHABILITATION | Facility: HOSPITAL | Age: 55
End: 2025-01-30
Payer: COMMERCIAL

## 2025-01-30 DIAGNOSIS — M62.81 MUSCLE WEAKNESS: Primary | ICD-10-CM

## 2025-01-30 DIAGNOSIS — M25.531 RIGHT WRIST PAIN: ICD-10-CM

## 2025-01-30 DIAGNOSIS — M25.532 LEFT WRIST PAIN: ICD-10-CM

## 2025-01-30 PROCEDURE — 97018 PARAFFIN BATH THERAPY: CPT

## 2025-01-30 PROCEDURE — 97530 THERAPEUTIC ACTIVITIES: CPT

## 2025-01-30 PROCEDURE — 97140 MANUAL THERAPY 1/> REGIONS: CPT

## 2025-01-30 PROCEDURE — 97110 THERAPEUTIC EXERCISES: CPT

## 2025-01-30 NOTE — PROGRESS NOTES
"  Occupational Therapy Daily Treatment Note     Name: Kaylee Rizvi [facility] Number: [id number]    Therapy Diagnosis:   Encounter Diagnoses   Name Primary?    Muscle weakness Yes    Right wrist pain     Left wrist pain        Physician: Mau Barbour PA-C    Visit Date: 1/30/2025  Physician Orders: Eval and Treat  Medical Diagnosis: Z98.890 (ICD-10-CM) - Post-operative state G56.00 (ICD-10-CM) - Carpal tunnel syndrome, unspecified laterality  Surgical Procedure and Date:  status post Right Carpal Tunnel Release by Dr. Oconnell on 12/9/24; L CTR 11/8/2024  Evaluation Date: 1/8/2025  Insurance Authorization Period Expiration: 12/23/2025  Plan of Care Certification Period: 4/4/2025  Date of Return to MD: fitz  Visit # / Visits authorized: 4 / 10  FOTO: 1/3     Precautions:  Standard  Time In: 2:00 pm  Time Out: 2:55 pm  Total Billable Time: 55 minutes    Subjective     Pt reports: "I still have some pain in the right in the pinky."  she was compliant with home exercise program given last session.   Response to previous treatment:soreness in the L hand  Functional change: still having a little pain with pinching and toileting     Pain: 6/10 in SF, and 4/10 in thumbs  Location: bilateral wrists/hands     Objective   Observation/Appearance: the incisions are well healed with no SOI, no TTP at the lat/med epicondyles. She did have some open wounds on the thumbs but she said this was from "burning" herself when cooking around the holidays. This was nothing to be concerned with, but could not perform advanced modalities at this time.      Special Tests: n/a     Edema. Measured in centimeters.    1/8/2025 1/8/2025         Left Right       Wrist Crease 14.7 cm 15 cm       Distal Palmar Crease 18.5 cm 18.5 cm             Elbow and Wrist ROM. Measured in degrees.    1/8/2025 1/8/2025         Left Right       Elbow Ext/Flex WNL WNL       Supination/Pronation           Wrist Ext/Flex 75/65 60/45       Wrist RD/UD 25/55 20/50   "        Hand ROM. Measured in degrees.    1/8/2025 1/8/2025         Left Right       Composite fist WNL full fist WNL full fist           Strength (Dynamometer) and Pinch Strength (Pinch Gauge)  Measured in pounds.    1/8/2025 1/8/2025         Left Right       Rung II 7# 19#       Judge Pinch 4# 5#       3pt Pinch 5# 5#          Sensation: not formally assessed  Static sensation in the hands  - no burning sensation or numbness  - however, she is having aching pain in both hands/elbows        CMS Impairment/Limitation/Restriction for FOTO hand Survey     Therapist reviewed FOTO scores for Kaylee Belkys Clement on 1/8/2025.   FOTO documents entered into [employer] - see Media section.     Functional Status Score: 36%            Treatment      Kaylee received the following supervised modalities after being cleared for contradictions for 10 minutes:   -Patient tolerates paraffin w/ MHP to both hands for 10 min, pre-tx to decrease pain & increase tissue extensibility concurrent with assessment of deficits.      Manual therapy techniques:  x 15  .Gentle IASTM to bilateral CMCJs in order to increase soft tissue extensibility, decrease pain, and tenderness/hypersensitivity in the stated area, and promote scar tissue remodeling.    - Ktape to R thumb with ulnar/radial wrist support  - Ktape to L thumb with radial wrist support    Kaylee received therapeutic exercises for 20 minutes including:  - Therapist A gentle ROM of the thumbs  - webspace stretch  - TGEs x 15   - wrist ROM 0# 2/10 each   - prayer stretch 3x20'  - red flexbar smiles and frowns x 20 reps  - dart throwers 2/10  - thumb ROM     Kaylee participated in dynamic functional therapeutic activities to improve functional performance for 10  minutes, including:  - proprioception flexbar shakes 1 min each way   - wrist true balance x 2 min each   - CMC isometrics with tennis ball    Home Exercises and Education Provided     Education provided:   - cont HEP  - Progress  towards goals     Written Home Exercises Provided: Patient instructed to cont prior HEP.  Exercises were reviewed and Kaylee was able to demonstrate them prior to the end of the session.  Kaylee demonstrated good  understanding of the HEP provided.   .   See EMR under Patient Instructions for exercises provided  at IE .        Assessment     Pt tolerated tx fairly today. She cont to report pain in R SF and in thumbs. She reported she felt KT helped her thumbs. She participated well. Pt would continue to benefit from skilled OT. Continue skilled OT POC and progress per protocol as tolerated.      Kaylee is progressing fairly  towards her goals and there are no updates to goals at this time. Pt prognosis is Good.     Pt will continue to benefit from skilled outpatient occupational therapy to address the deficits listed in the problem list on initial evaluation provide pt/family education and to maximize pt's level of independence in the home and community environment.     Anticipated barriers to occupational therapy: n/a    Pt's spiritual, cultural and educational needs considered and pt agreeable to plan of care and goals.    Goals:  The following goals were discussed with the patient and patient is in agreement with them as to be addressed in the treatment plan.   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 Not met 1/30/2025, continue progressing   3) Pt will reduce pain to less than 4/10 by 4 weeks. Not met 1/30/2025, continue progressing   4) Pt will increase functional  strength by 5# in order to A in opening containers for med management or home management tasks by 4 weeks. Not met 1/30/2025, continue progressing      Long Term Goals:  Goals to be met by discharge:  1) Independent with HEPNot met 1/30/2025, continue progressing   2) Pt will increase R wrist ADRIAN by 15-20 degrees in order  to increase functional use for grasp with home management or work related tasks by d/c.  Not met 1/30/2025, continue progressing   3) Pt will decrease edema to trace or none to increase functional ROM by d/c. Not met 1/30/2025, continue progressing   4) Pt will decrease pain to trace or none while completing light home management tasks or work related tasks by d/c. Not met 1/30/2025, continue progressing   5) Patient will verbalize indep with zippering and buttoning with no difficulty by d/c. Not met 1/30/2025, continue progressing   5) Patient will be able to improve by 20 pts on the FOTO, demonstrating overall improved functional ability with upper extremity.  (Self-care category) Not met 1/30/2025, continue progressing     Plan   Continue skilled OT POC and progress per protocol as tolerated.   Updates/Grading for next session: cont current plan    STEPHANIE Rudolph, ELENAT

## 2025-02-04 ENCOUNTER — CLINICAL SUPPORT (OUTPATIENT)
Dept: REHABILITATION | Facility: HOSPITAL | Age: 55
End: 2025-02-04
Payer: COMMERCIAL

## 2025-02-04 DIAGNOSIS — M62.81 MUSCLE WEAKNESS: Primary | ICD-10-CM

## 2025-02-04 DIAGNOSIS — M25.531 RIGHT WRIST PAIN: ICD-10-CM

## 2025-02-04 DIAGNOSIS — M25.532 LEFT WRIST PAIN: ICD-10-CM

## 2025-02-04 PROCEDURE — 97140 MANUAL THERAPY 1/> REGIONS: CPT

## 2025-02-04 PROCEDURE — 97018 PARAFFIN BATH THERAPY: CPT

## 2025-02-04 PROCEDURE — 97110 THERAPEUTIC EXERCISES: CPT

## 2025-02-04 NOTE — PROGRESS NOTES
"  Occupational Therapy Daily Treatment Note     Name: Kaylee Rizvi [facility] Number: [id number]    Therapy Diagnosis:   Encounter Diagnoses   Name Primary?    Muscle weakness Yes    Right wrist pain     Left wrist pain        Physician: Mau Barbour PA-C    Visit Date: 2/4/2025  Physician Orders: Eval and Treat  Medical Diagnosis: Z98.890 (ICD-10-CM) - Post-operative state G56.00 (ICD-10-CM) - Carpal tunnel syndrome, unspecified laterality  Surgical Procedure and Date:  status post Right Carpal Tunnel Release by Dr. Oconnell on 12/9/24; L CTR 11/8/2024  Evaluation Date: 1/8/2025  Insurance Authorization Period Expiration: 12/23/2025  Plan of Care Certification Period: 4/4/2025  Date of Return to MD: fitz  Visit # / Visits authorized: 6 / 10  FOTO: 1/3     Precautions:  Standard  Time In: 2:05 pm  Time Out: 3:00 pm  Total Billable Time: 55 minutes    Subjective     Pt reports: "I fell at work yesterday and landed on my hand and the pain is excruciating today."   she was compliant with home exercise program given last session.   Response to previous treatment:soreness in the L hand  Functional change: still having a little pain with pinching and toileting     Pain: 10/10 R hand  Location: bilateral wrists/hands     Objective   Observation/Appearance: the incisions are well healed with no SOI, no TTP at the lat/med epicondyles. She did have some open wounds on the thumbs but she said this was from "burning" herself when cooking around the holidays. This was nothing to be concerned with, but could not perform advanced modalities at this time.      Special Tests: n/a     Edema. Measured in centimeters.    1/8/2025 1/8/2025         Left Right       Wrist Crease 14.7 cm 15 cm       Distal Palmar Crease 18.5 cm 18.5 cm             Elbow and Wrist ROM. Measured in degrees.    1/8/2025 1/8/2025         Left Right       Elbow Ext/Flex WNL WNL       Supination/Pronation           Wrist Ext/Flex 75/65 60/45       Wrist " RD/UD 25/55 20/50          Hand ROM. Measured in degrees.    1/8/2025 1/8/2025         Left Right       Composite fist WNL full fist WNL full fist           Strength (Dynamometer) and Pinch Strength (Pinch Gauge)  Measured in pounds.    1/8/2025 1/8/2025         Left Right       Rung II 7# 19#       Judge Pinch 4# 5#       3pt Pinch 5# 5#          Sensation: not formally assessed  Static sensation in the hands  - no burning sensation or numbness  - however, she is having aching pain in both hands/elbows        CMS Impairment/Limitation/Restriction for FOTO hand Survey     Therapist reviewed FOTO scores for Kaylee Clement on 1/8/2025.   FOTO documents entered into [employer] - see Media section.     Functional Status Score: 36%            Treatment      Kaylee received the following supervised modalities after being cleared for contradictions for 10 minutes:   -Patient tolerates paraffin w/ MHP to both hands for 10 min, pre-tx to decrease pain & increase tissue extensibility concurrent with assessment of deficits.      Manual therapy techniques:  x 20  .Gentle IASTM to bilateral CMCJs in order to increase soft tissue extensibility, decrease pain, and tenderness/hypersensitivity in the stated area, and promote scar tissue remodeling.    - Ktape to R thumb with ulnar/radial wrist support  - Ktape to L thumb with radial wrist support    Kaylee received therapeutic exercises for 35 minutes including:  - Therapist A gentle ROM of the thumbs  - assessment of R hand for adnormalities.  - webspace stretch  - TGEs x 15   - wrist ROM 1# 2/10 each (L only)  - prayer stretch 3x20'  - red flexbar smiles and frowns x 20 reps  - dart throwers 2/10  - thumb ROM   - finger<>palm in hand manip with small poms  (L only)    Home Exercises and Education Provided     Education provided:   - cont HEP  - Progress towards goals     Written Home Exercises Provided: Patient instructed to cont prior HEP.  Exercises were reviewed and  Kaylee was able to demonstrate them prior to the end of the session.  Kaylee demonstrated good  understanding of the HEP provided.   .   See EMR under Patient Instructions for exercises provided  at IE .        Assessment   Upon arrival, patient states that she had an incident at work yesterday which caused her to fall and land on the palm of her R hand. She says since the fall he pain has been excruciating over a 10/10 and causing nerve/burning pain all over the hand and into the wrist. 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 There is a bit of increased swelling on the ulnar side of the volar incision, which is where the pain is located. Took session easy with little to no resistance to avoid aggravating the previously stated area. Encouraged a period of rest and some ice do decrease inflammatory response for the R hand. If symptoms continue to persist from the fall, we may refer back to hand MD for re-eval. Pt would continue to benefit from skilled OT. Continue skilled OT POC and progress per protocol as tolerated.      Kaylee is progressing fairly well towards her goals and there are no updates to goals at this time. Pt prognosis is Good.     Pt will continue to benefit from skilled outpatient occupational therapy to address the deficits listed in the problem list on initial evaluation provide pt/family education and to maximize pt's level of independence in the home and community environment.     Anticipated barriers to occupational therapy: n/a    Pt's spiritual, cultural and educational needs considered and pt agreeable to plan of care and goals.    Goals:  The following goals were discussed with the patient and patient is in agreement with them as to be addressed in the treatment plan.   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 Not met 2/4/2025,  continue progressing   3) Pt will reduce pain to less than 4/10 by 4 weeks. 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 Not met 2/4/2025, continue progressing      Long Term Goals:  Goals to be met by discharge:  1) Independent with HEPNot met 2/4/2025, continue progressing   2) Pt will increase R wrist ADRIAN by 15-20 degrees in order to increase functional use for grasp with home management or work related tasks by d/c.  Not met 2/4/2025, continue progressing   3) Pt will decrease edema to trace or none to increase functional ROM by d/c. Not met 2/4/2025, continue progressing   4) Pt will decrease pain to trace or none while completing light home management tasks or work related tasks by d/c. Not met 2/4/2025, continue progressing   5) Patient will verbalize indep with zippering and buttoning with no difficulty by d/c. Not met 2/4/2025, continue progressing   5) Patient will be able to improve by 20 pts on the FOTO, demonstrating overall improved functional ability with upper extremity.  (Self-care category) Not met 2/4/2025, continue progressing     Plan   Continue skilled OT POC and progress per protocol as tolerated.   Updates/Grading for next session: cont current plan    Belkys Camara, OT

## 2025-02-05 ENCOUNTER — PATIENT MESSAGE (OUTPATIENT)
Dept: REHABILITATION | Facility: HOSPITAL | Age: 55
End: 2025-02-05
Payer: COMMERCIAL

## 2025-02-05 ENCOUNTER — PATIENT MESSAGE (OUTPATIENT)
Dept: ORTHOPEDICS | Facility: CLINIC | Age: 55
End: 2025-02-05
Payer: COMMERCIAL

## 2025-02-05 DIAGNOSIS — R52 PAIN: Primary | ICD-10-CM

## 2025-02-08 ENCOUNTER — OFFICE VISIT (OUTPATIENT)
Dept: URGENT CARE | Facility: CLINIC | Age: 55
End: 2025-02-08
Payer: COMMERCIAL

## 2025-02-08 VITALS
SYSTOLIC BLOOD PRESSURE: 116 MMHG | WEIGHT: 134.06 LBS | DIASTOLIC BLOOD PRESSURE: 78 MMHG | TEMPERATURE: 99 F | OXYGEN SATURATION: 98 % | HEART RATE: 73 BPM | HEIGHT: 59 IN | BODY MASS INDEX: 27.03 KG/M2 | RESPIRATION RATE: 16 BRPM

## 2025-02-08 DIAGNOSIS — R05.8 ALLERGIC COUGH: ICD-10-CM

## 2025-02-08 DIAGNOSIS — J06.9 UPPER RESPIRATORY TRACT INFECTION, UNSPECIFIED TYPE: Primary | ICD-10-CM

## 2025-02-08 PROCEDURE — 99213 OFFICE O/P EST LOW 20 MIN: CPT | Mod: S$GLB,,, | Performed by: NURSE PRACTITIONER

## 2025-02-08 RX ORDER — PREDNISONE 20 MG/1
20 TABLET ORAL DAILY
Qty: 3 TABLET | Refills: 0 | Status: SHIPPED | OUTPATIENT
Start: 2025-02-08 | End: 2025-02-11

## 2025-02-08 RX ORDER — PROMETHAZINE HYDROCHLORIDE AND DEXTROMETHORPHAN HYDROBROMIDE 6.25; 15 MG/5ML; MG/5ML
5 SYRUP ORAL
Qty: 118 ML | Refills: 0 | Status: SHIPPED | OUTPATIENT
Start: 2025-02-08

## 2025-02-08 NOTE — PROGRESS NOTES
"Subjective:      Patient ID: Kaylee Clement is a 54 y.o. female.    Vitals:  height is 4' 11" (1.499 m) and weight is 60.8 kg (134 lb 0.6 oz). Her oral temperature is 98.5 °F (36.9 °C). Her blood pressure is 116/78 and her pulse is 73. Her respiration is 16 and oxygen saturation is 98%.     Chief Complaint: Cough    Pt present with dry  cough, headaches, hoarseness, sore throat with burning sensation, concerned that since she's got cats she has been having these same symptoms, wheezing at night , home tx; benadryl allergy and sinus  Provider note below:  This is a 54 y.o. female who presents today with a chief complaint of dry cough, sinus headache, hoarseness voice, sore throat with burning sensation, wheezing at night, patient reports she is taking allergy medicine and using albuterol inhaler with a relief, patient reports she is unsure if this is because of the CaT she got since last September and this is the 3rd or 4th time she is having symptoms on and off, patient reports she gets a tickle in her throat then gets the cough, denies fever, body aches or chills, denies shortness of breath, denies nausea, vomiting, diarrhea or abdominal pain, denies chest pain or dizziness positional lightheadedness, denies trouble swallowing, denies loss of taste or smell, or any other symptoms       Cough  This is a new problem. The current episode started in the past 7 days. The problem has been waxing and waning. The cough is Non-productive. Associated symptoms include headaches, nasal congestion, postnasal drip, rhinorrhea, a sore throat and wheezing. Pertinent negatives include no ear congestion, ear pain, fever or shortness of breath. The symptoms are aggravated by animals. Risk factors for lung disease include animal exposure. She has tried a beta-agonist inhaler for the symptoms.       Constitution: Negative for fever.   HENT:  Positive for postnasal drip and sore throat. Negative for ear pain.    Respiratory:  " Positive for cough and wheezing. Negative for shortness of breath.    Neurological:  Positive for headaches.      Past Medical History:   Diagnosis Date    Anxiety     Asthmatic bronchitis     Hypertension     IBS (irritable bowel syndrome)     Neuropathy     left foot       Objective:     Physical Exam   Constitutional: She is oriented to person, place, and time. She appears well-developed. She is cooperative.  Non-toxic appearance. She does not appear ill. No distress.      Comments:Patient sitting comfortably on the exam table, non toxic appearance  and answering questions appropriately, no acute distress        HENT:   Head: Normocephalic and atraumatic.   Ears:   Right Ear: Hearing, tympanic membrane, external ear and ear canal normal.   Left Ear: Hearing, tympanic membrane, external ear and ear canal normal.   Nose: Nose normal. No mucosal edema, rhinorrhea or nasal deformity. No epistaxis. Right sinus exhibits no maxillary sinus tenderness and no frontal sinus tenderness. Left sinus exhibits no maxillary sinus tenderness and no frontal sinus tenderness.   Mouth/Throat: Uvula is midline, oropharynx is clear and moist and mucous membranes are normal. No trismus in the jaw. Normal dentition. No uvula swelling. No oropharyngeal exudate, posterior oropharyngeal edema, posterior oropharyngeal erythema, tonsillar abscesses or cobblestoning.   Eyes: Conjunctivae and lids are normal. No scleral icterus.   Neck: Trachea normal and phonation normal. Neck supple. No edema present. No erythema present. No neck rigidity present.   Cardiovascular: Normal rate, regular rhythm, normal heart sounds and normal pulses.   Pulmonary/Chest: Effort normal and breath sounds normal. No stridor. No respiratory distress. She has no decreased breath sounds. She has no wheezes. She has no rhonchi. She has no rales.   Lungs CTA         Comments: Lungs CTA    Abdominal: Normal appearance.   Musculoskeletal: Normal range of motion.          General: No deformity. Normal range of motion.   Neurological: She is alert and oriented to person, place, and time. She exhibits normal muscle tone. Coordination normal.   Skin: Skin is warm, dry, intact, not diaphoretic and not pale.   Psychiatric: Her speech is normal and behavior is normal. Judgment and thought content normal.   Nursing note and vitals reviewed.        Patient in no acute distress.  Vitals reassuring.  Discussed results/diagnosis/plan in depth with patient in clinic. Strict precautions given to patient to monitor for worsening signs and symptoms. Advised to follow up with primary.All questions answered. Strict ER precautions given. If your symptoms worsens or fail to improve you should go to the Emergency Room. Discharge and follow-up instructions given verbally/printed. Discharge and follow-up instructions discussed with the patient who expressed understanding and willingness to comply with my recommendations.Patient voiced understanding and in agreement with current treatment plan.     Please be advised this text was dictated with [employer] software and may contain errors due to translation.   Assessment:     1. Upper respiratory tract infection, unspecified type    2. Allergic cough        Plan:       Upper respiratory tract infection, unspecified type    Allergic cough  -     predniSONE (DELTASONE) 20 MG tablet; Take 1 tablet (20 mg total) by mouth once daily. for 3 days  Dispense: 3 tablet; Refill: 0  -     promethazine-dextromethorphan (PROMETHAZINE-DM) 6.25-15 mg/5 mL Syrp; Take 5 mLs by mouth every 4 to 6 hours as needed (cough). 5 mL every 4 to 6 hours; maximum: 30 mL in 24 hours  Dispense: 118 mL; Refill: 0          Medical Decision Making:   History:   Old Medical Records: I decided to obtain old medical records.  Old Records Summarized: records from clinic visits and records from previous admission(s).  Clinical Tests:   Lab Tests: Reviewed  Urgent Care Management:  Patient in no acute  distress.  Vitals reassuring.  On exam, patient is nontoxic appearing and afebrile.  Lungs CTA.  Patient taking the allergy medicine.  Unsure if she is getting these symptoms on and off since she got the cats.  Advised patient to continue using the albuterol inhaler and the allergy medicine.  Will prescribe her the cough medication and low-dose short course of oral steroids with detailed education provided about side effects and recommendations.  Medication prescribed and over-the-counter medication discussed with patient at length.  Proper hydration advised.  I reiterated the importance of further evaluation if no improvement symptoms and follow-up with primary. Patient voiced understanding and in agreement with current treatment plan.           Patient Instructions   PLEASE READ YOUR DISCHARGE INSTRUCTIONS ENTIRELY AS IT CONTAINS IMPORTANT INFORMATION.      Please drink plenty of fluids.      Do not drive while taking the cough syrup - best to take it at night before going to sleep. However, you can take it during the day (every 4-6 hours) if you do not have to drive or operate machinery. This medication will make you drowsy. Try taking half a dose first to see how it affects you.        Please get plenty of rest.    Please return here or go to the Emergency Department for any concerns or worsening of condition.    Please take an over the counter antihistamine medication (allegra/Claritin/Zyrtec) of your choice as directed.    Try an over the counter decongestant like Mucinex D or Sudafed. You buy this behind the pharmacy counter    If you do have Hypertension or palpitations, it is safe to take Coricidin HBP for relief of sinus symptoms.    If not allergic, please take over the counter Tylenol (Acetaminophen) and/or Motrin (Ibuprofen) as directed for control of pain and/or fever.  Please follow up with your primary care doctor or specialist as needed.    Sore throat recommendations: Warm fluids, warm salt water  gargles, throat lozenges, tea, honey, soup, rest, hydration.    Use over the counter flonase: one spray each nostril twice daily OR two sprays each nostril once daily.     If you  smoke, please stop smoking.      Please return or see your primary care doctor if you develop new or worsening symptoms.     Please arrange follow up with your primary medical clinic as soon as possible. You must understand that you've received an Urgent Care treatment only and that you may be released before all of your medical problems are known or treated. You, the patient, will arrange for follow up as instructed. If your symptoms worsen or fail to improve you should go to the Emergency Room.

## 2025-02-08 NOTE — PATIENT INSTRUCTIONS
PLEASE READ YOUR DISCHARGE INSTRUCTIONS ENTIRELY AS IT CONTAINS IMPORTANT INFORMATION.      Please drink plenty of fluids.      Do not drive while taking the cough syrup - best to take it at night before going to sleep. However, you can take it during the day (every 4-6 hours) if you do not have to drive or operate machinery. This medication will make you drowsy. Try taking half a dose first to see how it affects you.        Please get plenty of rest.    Please return here or go to the Emergency Department for any concerns or worsening of condition.    Please take an over the counter antihistamine medication (allegra/Claritin/Zyrtec) of your choice as directed.    Try an over the counter decongestant like Mucinex D or Sudafed. You buy this behind the pharmacy counter    If you do have Hypertension or palpitations, it is safe to take Coricidin HBP for relief of sinus symptoms.    If not allergic, please take over the counter Tylenol (Acetaminophen) and/or Motrin (Ibuprofen) as directed for control of pain and/or fever.  Please follow up with your primary care doctor or specialist as needed.    Sore throat recommendations: Warm fluids, warm salt water gargles, throat lozenges, tea, honey, soup, rest, hydration.    Use over the counter flonase: one spray each nostril twice daily OR two sprays each nostril once daily.     If you  smoke, please stop smoking.      Please return or see your primary care doctor if you develop new or worsening symptoms.     Please arrange follow up with your primary medical clinic as soon as possible. You must understand that you've received an Urgent Care treatment only and that you may be released before all of your medical problems are known or treated. You, the patient, will arrange for follow up as instructed. If your symptoms worsen or fail to improve you should go to the Emergency Room.

## 2025-02-10 ENCOUNTER — TELEPHONE (OUTPATIENT)
Dept: INTERNAL MEDICINE | Facility: CLINIC | Age: 55
End: 2025-02-10
Payer: COMMERCIAL

## 2025-02-10 NOTE — TELEPHONE ENCOUNTER
----- Message from Sherin sent at 2/10/2025 10:37 AM CST -----  Contact: Self/ [phone]  Caller is requesting an earlier appointment then we can schedule.  Caller is requesting a message be sent to the provider.    If this is for urgent care symptoms, did you offer other providers at this location, providers at other locations, or [facility]? (yes, no, n/a):  yes pt declined     If this is for the patients physical, did you offer to schedule next available and put on wait list, or to see NP or PA for their physical?  (yes, no, n/a): n/a     When is the next available appointment with their provider:  2/14    Reason for the appointment:  [facility] follow up pt stated that she is not getting any better ( fever since Saturday/cough /body aches) pt was prescribed medication but it is not helping     Patient preference of timeframe to be scheduled:  today     Would the patient like a call back, or a response through their [facility] portal?:   call back     Comments:pt stated that she tested negative for the Flu and Covid

## 2025-02-11 ENCOUNTER — PATIENT MESSAGE (OUTPATIENT)
Dept: INTERNAL MEDICINE | Facility: CLINIC | Age: 55
End: 2025-02-11
Payer: COMMERCIAL

## 2025-02-12 NOTE — TELEPHONE ENCOUNTER
Pt has appt scheduled on 2/19 with PCP for f/u    2/10 telephone encounter received, call doesn't appear to have been returned

## 2025-04-14 ENCOUNTER — PATIENT OUTREACH (OUTPATIENT)
Dept: ADMINISTRATIVE | Facility: HOSPITAL | Age: 55
End: 2025-04-14
Payer: COMMERCIAL

## 2025-04-14 DIAGNOSIS — Z00.00 HEALTHCARE MAINTENANCE: Primary | ICD-10-CM

## 2025-04-14 DIAGNOSIS — Z12.12 ENCOUNTER FOR COLORECTAL CANCER SCREENING: ICD-10-CM

## 2025-04-14 DIAGNOSIS — Z12.4 CERVICAL CANCER SCREENING: ICD-10-CM

## 2025-04-14 DIAGNOSIS — Z12.11 ENCOUNTER FOR COLORECTAL CANCER SCREENING: ICD-10-CM

## 2025-04-14 NOTE — PROGRESS NOTES
Chart reviewed and updated. Reconciled immunizations, health maintenance and care everywhere.  Placed referrals for Gynecology, Refendo and labs.    Carolann Jones, [facility]  Panel Care Coordinator  [facility]  [phone]  For Jonathan Melo MD

## 2025-04-23 ENCOUNTER — OFFICE VISIT (OUTPATIENT)
Dept: RHEUMATOLOGY | Facility: CLINIC | Age: 55
End: 2025-04-23
Payer: COMMERCIAL

## 2025-04-23 VITALS
WEIGHT: 126.31 LBS | BODY MASS INDEX: 25.51 KG/M2 | SYSTOLIC BLOOD PRESSURE: 101 MMHG | HEART RATE: 70 BPM | DIASTOLIC BLOOD PRESSURE: 69 MMHG

## 2025-04-23 DIAGNOSIS — M25.551 BILATERAL HIP PAIN: ICD-10-CM

## 2025-04-23 DIAGNOSIS — M79.7 FIBROMYALGIA: Primary | ICD-10-CM

## 2025-04-23 DIAGNOSIS — M25.552 BILATERAL HIP PAIN: ICD-10-CM

## 2025-04-23 PROCEDURE — 99213 OFFICE O/P EST LOW 20 MIN: CPT | Mod: S$GLB,,, | Performed by: INTERNAL MEDICINE

## 2025-04-23 PROCEDURE — 1159F MED LIST DOCD IN RCRD: CPT | Mod: CPTII,S$GLB,, | Performed by: INTERNAL MEDICINE

## 2025-04-23 PROCEDURE — G2211 COMPLEX E/M VISIT ADD ON: HCPCS | Mod: S$GLB,,, | Performed by: INTERNAL MEDICINE

## 2025-04-23 PROCEDURE — 4010F ACE/ARB THERAPY RXD/TAKEN: CPT | Mod: CPTII,S$GLB,, | Performed by: INTERNAL MEDICINE

## 2025-04-23 PROCEDURE — 1160F RVW MEDS BY RX/DR IN RCRD: CPT | Mod: CPTII,S$GLB,, | Performed by: INTERNAL MEDICINE

## 2025-04-23 PROCEDURE — 3074F SYST BP LT 130 MM HG: CPT | Mod: CPTII,S$GLB,, | Performed by: INTERNAL MEDICINE

## 2025-04-23 PROCEDURE — 99999 PR PBB SHADOW E&M-EST. PATIENT-LVL III: CPT | Mod: PBBFAC,,, | Performed by: INTERNAL MEDICINE

## 2025-04-23 PROCEDURE — 3078F DIAST BP <80 MM HG: CPT | Mod: CPTII,S$GLB,, | Performed by: INTERNAL MEDICINE

## 2025-04-23 PROCEDURE — 3008F BODY MASS INDEX DOCD: CPT | Mod: CPTII,S$GLB,, | Performed by: INTERNAL MEDICINE

## 2025-04-23 RX ORDER — SULINDAC 200 MG/1
200 TABLET ORAL 2 TIMES DAILY
Qty: 60 TABLET | Refills: 5 | Status: SHIPPED | OUTPATIENT
Start: 2025-04-23

## 2025-04-23 RX ORDER — PREGABALIN 25 MG/1
25 CAPSULE ORAL 2 TIMES DAILY
Qty: 60 CAPSULE | Refills: 5 | Status: SHIPPED | OUTPATIENT
Start: 2025-04-23

## 2025-04-23 NOTE — PROGRESS NOTES
4/22/2025     8:40 AM   Rapid3 Question Responses and Scores   MDHAQ Score 0.8   Psychologic Score 6.6   Pain Score 6   When you awakened in the morning OVER THE LAST WEEK, did you feel stiff? Yes   If Yes, please indicate the number of hours until you are as limber as you will be for the day 2   Fatigue Score 8   Global Health Score 6   RAPID3 Score 4.89     Answers submitted by the patient for this visit:  Rheumatology Questionnaire (Submitted on 4/22/2025)  fever: No  eye redness: No  mouth sores: Yes  headaches: Yes  shortness of breath: No  chest pain: Yes  trouble swallowing: No  diarrhea: No  constipation: No  unexpected weight change: No  genital sore: No  dysuria: No  During the last 3 days, have you had a skin rash?: No  Bruises or bleeds easily: Yes  cough: No

## 2025-04-26 NOTE — PROGRESS NOTES
History of present illness: 54-year-old female has a history of fatigue and aching since she was 20 years old. It is been worse for the past 2 years. She does not remember anything happening 2 years ago that may have made the pain worse. The pain is bad in the morning. She has 1 hour morning stiffness. The pain does not get worse with activity but does interfere with activity. It does wake her up at night. She has had some leg cramps as well. She has occasional swelling in the knee. She has had no erythema but some increased warmth accompanying the swelling. She has had previous back surgery.     I saw her for the 1st time in August.  I felt she had fibromyalgia and may be developing early osteoarthritis.  She had a positive MELINDA but a negative MELINDA profile.  She had no signs or symptoms of autoimmune disease.  I increased her tizanidine.  I continued her on meloxicam.    She had bilateral carpal tunnel surgery which helped.  She complains of some increased pain in the left leg.  It is worse during the day.  It does wake her up at night.  She describes it as burning.  It is better in the a.m..  Heat and topical medications did not help.  She has also had some leg cramps.  She has had some pain in the right buttock.    She does not feel that the meloxicam and tizanidine are helping.  Advil did not help.  She had previously been on Flexeril, gabapentin, Robaxin, and etodolac.      Physical examination:   Musculoskeletal:  She has decreased range of motion of the cervical spine.  Shoulders, elbows, wrists are unremarkable.    She has bony hypertrophy of the left 1st CMC.  She has no synovitis in the hands.  She has tenderness of the right buttock.  She has pain on lateral bending of the spine to the right which is referred to the right buttock.  She has pain on abduction of the left hip.  Right hip is unremarkable.    Knees, ankles, small joints the feet are unremarkable.    Assessment:  1. She has underlying  fibromyalgia   2. She may have some sciatic pain at the present time   3. She has early osteoarthritis     Plans:  1.  Change anti-inflammatory medicine to sulindac 200 mg twice daily   2. I placed her on Lyrica starting at 25 mg twice daily.  I told her the dose could be increased in the future.  3.  I have referred her for physical therapy   4.  Return in 6 months      Answers submitted by the patient for this visit:  Rheumatology Questionnaire (Submitted on 4/22/2025)  fever: No  eye redness: No  mouth sores: Yes  headaches: Yes  shortness of breath: No  chest pain: Yes  trouble swallowing: No  diarrhea: No  constipation: No  unexpected weight change: No  genital sore: No  dysuria: No  During the last 3 days, have you had a skin rash?: No  Bruises or bleeds easily: Yes  cough: No     normal...

## 2025-06-17 ENCOUNTER — PATIENT MESSAGE (OUTPATIENT)
Dept: INTERNAL MEDICINE | Facility: CLINIC | Age: 55
End: 2025-06-17
Payer: COMMERCIAL

## 2025-06-17 RX ORDER — VALSARTAN 80 MG/1
80 TABLET ORAL DAILY
Qty: 90 TABLET | Refills: 1 | Status: SHIPPED | OUTPATIENT
Start: 2025-06-17

## 2025-06-17 NOTE — TELEPHONE ENCOUNTER
Provider Staff:  Action required for this patient    Requires labs      Please see care gap opportunities below in Care Due Message.    Thanks!  [facility]     Appointments      Date Provider   Last Visit   11/18/2024 Jonathan Melo MD   Next Visit   Visit date not found Jonathan Melo MD     Refill Decision Note   Kaylee Clement  is requesting a refill authorization.  Brief Assessment and Rationale for Refill:  Approve     Medication Therapy Plan:        Comments:     Note composed:1:41 PM 06/17/2025

## 2025-07-17 RX ORDER — ATENOLOL 25 MG/1
25 TABLET ORAL
Qty: 90 TABLET | Refills: 1 | Status: SHIPPED | OUTPATIENT
Start: 2025-07-17

## 2025-07-17 NOTE — TELEPHONE ENCOUNTER
Refill Decision Note   Kaylee Clement  is requesting a refill authorization.  Brief Assessment and Rationale for Refill:  Approve     Medication Therapy Plan:        Comments:     Note composed:10:25 AM 07/17/2025

## 2025-07-17 NOTE — TELEPHONE ENCOUNTER
No care due was identified.  [facility] Embedded Care Due Messages. Reference number: [id number].   7/17/2025 8:07:20 AM CDT

## (undated) DEVICE — SYR B-D DISP CONTROL 10CC100/C

## (undated) DEVICE — NDL HYPO STD REG BVL 18GX1.5IN

## (undated) DEVICE — SOL IRR SOD CHL .9% POUR

## (undated) DEVICE — DRESSING N ADH OIL EMUL 3X3

## (undated) DEVICE — BANDAGE BULKEE II 2.25INX3YD

## (undated) DEVICE — SYR 10CC LUER LOCK

## (undated) DEVICE — TOWEL OR DISP STRL BLUE 4/PK

## (undated) DEVICE — BANDAGE MATRIX HK LOOP 2IN 5YD

## (undated) DEVICE — Device

## (undated) DEVICE — SOL POVIDONE SCRUB IODINE 4 OZ

## (undated) DEVICE — GOWN ECLIPSE REINF LVL4 TWL XL

## (undated) DEVICE — COVER CAMERA OPERATING ROOM

## (undated) DEVICE — GLOVE BIOGEL PI MICRO INDIC 7

## (undated) DEVICE — SUT 4/0 18IN ETHILON BL P3

## (undated) DEVICE — SPONGE COTTON TRAY 4X4IN

## (undated) DEVICE — NDL MONOJECT BLACK 22GA 1.5IN

## (undated) DEVICE — DRAPE STERI-DRAPE 1000 17X11IN

## (undated) DEVICE — GLOVE BIOGEL ECLIPSE SZ 7

## (undated) DEVICE — BLADE SURG #15 CARBON STEEL

## (undated) DEVICE — TOURNIQUET SB QC DP 18X4IN